# Patient Record
Sex: MALE | Race: WHITE | NOT HISPANIC OR LATINO | Employment: FULL TIME | URBAN - METROPOLITAN AREA
[De-identification: names, ages, dates, MRNs, and addresses within clinical notes are randomized per-mention and may not be internally consistent; named-entity substitution may affect disease eponyms.]

---

## 2017-01-17 ENCOUNTER — OFFICE VISIT (OUTPATIENT)
Dept: FAMILY MEDICINE | Facility: CLINIC | Age: 37
End: 2017-01-17
Payer: COMMERCIAL

## 2017-01-17 VITALS
DIASTOLIC BLOOD PRESSURE: 94 MMHG | HEART RATE: 78 BPM | RESPIRATION RATE: 14 BRPM | OXYGEN SATURATION: 94 % | HEIGHT: 72 IN | TEMPERATURE: 98.7 F | BODY MASS INDEX: 42.66 KG/M2 | SYSTOLIC BLOOD PRESSURE: 160 MMHG | WEIGHT: 315 LBS

## 2017-01-17 DIAGNOSIS — J20.9 ACUTE BRONCHITIS, UNSPECIFIED ORGANISM: Primary | ICD-10-CM

## 2017-01-17 DIAGNOSIS — J30.81 ALLERGIC RHINITIS DUE TO ANIMAL HAIR AND DANDER, UNSPECIFIED RHINITIS SEASONALITY: ICD-10-CM

## 2017-01-17 DIAGNOSIS — I10 ESSENTIAL HYPERTENSION: ICD-10-CM

## 2017-01-17 PROCEDURE — 99214 OFFICE O/P EST MOD 30 MIN: CPT | Performed by: PHYSICIAN ASSISTANT

## 2017-01-17 RX ORDER — FLUTICASONE PROPIONATE 50 MCG
1-2 SPRAY, SUSPENSION (ML) NASAL DAILY
Qty: 1 BOTTLE | Refills: 11 | Status: SHIPPED | OUTPATIENT
Start: 2017-01-17 | End: 2022-01-07

## 2017-01-17 RX ORDER — ALBUTEROL SULFATE 90 UG/1
2 AEROSOL, METERED RESPIRATORY (INHALATION) EVERY 6 HOURS PRN
Qty: 1 INHALER | Refills: 0 | Status: SHIPPED | OUTPATIENT
Start: 2017-01-17 | End: 2022-01-07

## 2017-01-17 RX ORDER — HYDROCHLOROTHIAZIDE 25 MG/1
25 TABLET ORAL DAILY
Qty: 30 TABLET | Refills: 0 | Status: SHIPPED | OUTPATIENT
Start: 2017-01-17 | End: 2017-02-13

## 2017-01-17 RX ORDER — PREDNISONE 20 MG/1
20 TABLET ORAL 2 TIMES DAILY
Qty: 10 TABLET | Refills: 0 | Status: SHIPPED | OUTPATIENT
Start: 2017-01-17 | End: 2017-02-15

## 2017-01-17 NOTE — PATIENT INSTRUCTIONS
Return in 3-4 weeks to recheck your blood pressure  Check your blood pressure at the gym 1-2 times per week; before workouts and write it down and bring it with you next time.

## 2017-01-17 NOTE — MR AVS SNAPSHOT
After Visit Summary   1/17/2017    Al Smith    MRN: [id number]           Patient Information     Date Of Birth          1980        Visit Information        Provider Department      1/17/2017 9:10 AM Siegler, Nicole Joy, PA-C [facility]        Today's Diagnoses     Acute bronchitis, unspecified organism    -  1     Essential hypertension         Allergic rhinitis due to animal hair and dander, unspecified rhinitis seasonality           Care Instructions    Return in 3-4 weeks to recheck your blood pressure  Check your blood pressure at the gym 1-2 times per week; before workouts and write it down and bring it with you next time.         Follow-ups after your visit        Who to contact     If you have questions or need follow up information about today's clinic visit or your schedule please contact [facility] directly at [phone].  Normal or non-critical lab and imaging results will be communicated to you by [email or web address], letter or phone within 4 business days after the clinic has received the results. If you do not hear from us within 7 days, please contact the clinic through [email or web address] or phone. If you have a critical or abnormal lab result, we will notify you by phone as soon as possible.  Submit refill requests through [employer] or call your pharmacy and they will forward the refill request to us. Please allow 3 business days for your refill to be completed.          Additional Information About Your Visit        MyChart Information     [employer] gives you secure access to your electronic health record. If you see a primary care provider, you can also send messages to your care team and make appointments. If you have questions, please call your primary care clinic.  If you do not have a primary care provider, please call [phone] and they will assist you.        Care EveryWhere ID     This is your Care EveryWhere ID. This could be  used by other organizations to access your [place] medical records  [id number]        Your Vitals Were     Pulse Temperature Respirations Height BMI (Body Mass Index) Pulse Oximetry    78 98.7  F (37.1  C) 14 6' (1.829 m) 44.61 kg/m2 94%       Blood Pressure from Last 3 Encounters:   01/17/17 160/94   07/16/16 167/108   04/18/16 130/86    Weight from Last 3 Encounters:   01/17/17 329 lb (149.233 kg)   07/16/16 315 lb (142.883 kg)   04/18/16 324 lb (146.965 kg)              Today, you had the following     No orders found for display         Today's Medication Changes          These changes are accurate as of: 1/17/17  9:29 AM.  If you have any questions, ask your nurse or doctor.               Start taking these medicines.        Dose/Directions    albuterol 108 (90 BASE) MCG/ACT Inhaler   Commonly known as:  PROAIR HFA/PROVENTIL HFA/VENTOLIN HFA   Used for:  Acute bronchitis, unspecified organism   Started by:  Siegler, Nicole Joy, PA-C        Dose:  2 puff   Inhale 2 puffs into the lungs every 6 hours as needed for shortness of breath / dyspnea or wheezing   Quantity:  1 Inhaler   Refills:  0       fluticasone 50 MCG/ACT spray   Commonly known as:  FLONASE   Used for:  Allergic rhinitis due to animal hair and dander, unspecified rhinitis seasonality   Started by:  Siegler, Nicole Joy, PA-C        Dose:  1-2 spray   Spray 1-2 sprays into both nostrils daily   Quantity:  1 Bottle   Refills:  11       hydrochlorothiazide 25 MG tablet   Commonly known as:  HYDRODIURIL   Used for:  Essential hypertension   Started by:  Siegler, Nicole Joy, PA-C        Dose:  25 mg   Take 1 tablet (25 mg) by mouth daily   Quantity:  30 tablet   Refills:  0       predniSONE 20 MG tablet   Commonly known as:  DELTASONE   Used for:  Acute bronchitis, unspecified organism   Started by:  Siegler, Nicole Joy, PA-C        Dose:  20 mg   Take 1 tablet (20 mg) by mouth 2 times daily   Quantity:  10 tablet   Refills:  0            Where to get  your medicines      These medications were sent to [facility] [place] - [address]  [address] [place] [address]     Phone:  [phone]    - albuterol 108 (90 BASE) MCG/ACT Inhaler  - fluticasone 50 MCG/ACT spray  - hydrochlorothiazide 25 MG tablet  - predniSONE 20 MG tablet             Primary Care Provider Office Phone # Fax #    Jonny Manzanares -[phone]       [facility] [address] [facility] [address]        Thank you!     Thank you for choosing [facility]  for your care. Our goal is always to provide you with excellent care. Hearing back from our patients is one way we can continue to improve our services. Please take a few minutes to complete the written survey that you may receive in the mail after your visit with us. Thank you!             Your Updated Medication List - Protect others around you: Learn how to safely use, store and throw away your medicines at www.disposemymeds.org.          This list is accurate as of: 1/17/17  9:29 AM.  Always use your most recent med list.                   Brand Name Dispense Instructions for use    ADVIL PO      Take by mouth as needed for moderate pain       albuterol 108 (90 BASE) MCG/ACT Inhaler    PROAIR HFA/PROVENTIL HFA/VENTOLIN HFA    1 Inhaler    Inhale 2 puffs into the lungs every 6 hours as needed for shortness of breath / dyspnea or wheezing       fluticasone 50 MCG/ACT spray    FLONASE    1 Bottle    Spray 1-2 sprays into both nostrils daily       hydrochlorothiazide 25 MG tablet    HYDRODIURIL    30 tablet    Take 1 tablet (25 mg) by mouth daily       predniSONE 20 MG tablet    DELTASONE    10 tablet    Take 1 tablet (20 mg) by mouth 2 times daily

## 2017-01-17 NOTE — PROGRESS NOTES
SUBJECTIVE:                                                    Al Smith is a 36 year old male who presents to clinic today for the following health issues:    RESPIRATORY SYMPTOMS      Duration: X3 weeks    Description  nasal congestion, facial pain/pressure and cough    Severity: moderate    Accompanying signs and symptoms: None    History (predisposing factors):  none    Precipitating or alleviating factors: None    Therapies tried and outcome:  guaifenesin     As above; about 3 weeks ago he began to have cold symptoms that have since developed into cough and some shortness of breath. Sinus pressure is improved with mucinex and ibuprofen. He has coughing cycles that have been hurting his abdominal muscles. His albuterol inhaler and flonase have been helpful also.     He stopped taking his blood pressure medication in about June/July as he did not feel it was improving his blood pressure when the pharmacy changed (the medicine seemed to be different). He continues to go to the gym 3-4 times per week, and watches his diet though he admits not watching his salt intake.         ROS:  C: Negative for fever, chills, recent change in weight  Skin: Negative for worrisome rashes or lesions  CV: Negative for chest pain or peripheral edema  GI: Negative for nausea, abdominal pain, heartburn, or change in bowel habits  MS: Negative for significant arthralgias or myalgias  P: Negative for changes in mood or affect      PFSH: not a smoker, no hx of asthma.   Was provided albuterol and flonase last year for symptoms similar to this and he has restarted those in the past 2-3 weeks.      Patient Active Problem List   Diagnosis     Morbid obesity (H)     Allergic rhinitis due to animals     Mixed hyperlipidemia     Essential hypertension     Current Outpatient Prescriptions   Medication     hydrochlorothiazide (HYDRODIURIL) 25 MG tablet     predniSONE (DELTASONE) 20 MG tablet     albuterol (PROAIR HFA/PROVENTIL  HFA/VENTOLIN HFA) 108 (90 BASE) MCG/ACT Inhaler     fluticasone (FLONASE) 50 MCG/ACT spray     Ibuprofen (ADVIL PO)     [DISCONTINUED] albuterol (PROAIR HFA, PROVENTIL HFA, VENTOLIN HFA) 108 (90 BASE) MCG/ACT inhaler     No current facility-administered medications for this visit.       OBJECTIVE:                                                    /94 mmHg  Pulse 78  Temp(Src) 98.7  F (37.1  C)  Resp 14  Ht 6' (1.829 m)  Wt 329 lb (149.233 kg)  BMI 44.61 kg/m2  SpO2 94%  Body mass index is 44.61 kg/(m^2).  GENERAL: healthy, alert, in no acute distress  EYES: Grossly normal to inspection, EOMI, PERRL  HENT: Ear canals normal bilateral. TM pearly gray without effusion bilaterally.  Nasal mucosa moderatly edematous with clear rhinorrhea.  Mouth- mucous membranes moist, no lesions or ulcerations. Pharynx erythematous without petechia. and No tonsillary hypertrophy. Uvula midline, clear post-nasal drainage.  Maxillary and frontal sinuses nontender to palpation bilaterally.  NECK: Non-tender, no adenopathy.  RESP: expiratory wheezes mildly throughout, mild rhonchi bibasilar and no rales or decreased breath sounds. No coughing during examination today.   CV: regular rate and rhythm, normal S1 S2.  No peripheral edema.  SKIN: no suspicious lesions, no rashes  PSYCH: Alert and oriented times 3;  Able to articulate logical thoughts. Affect is normal.    Diagnostic test results: none      ASSESSMENT/PLAN:                                                        ICD-10-CM    1. Acute bronchitis, unspecified organism J20.9 predniSONE (DELTASONE) 20 MG tablet     albuterol (PROAIR HFA/PROVENTIL HFA/VENTOLIN HFA) 108 (90 BASE) MCG/ACT Inhaler   2. Essential hypertension I10 hydrochlorothiazide (HYDRODIURIL) 25 MG tablet   3. Allergic rhinitis due to animal hair and dander, unspecified rhinitis seasonality J30.81 fluticasone (FLONASE) 50 MCG/ACT spray     Bronchitis and sinusitis instructions include discussion regarding  continued flonse and mucinex, nasal saline or neti pot to flush sinuses. He will return if not improving with treatment as discussed today or return of fever/chills, body aches or worsening facial pain/pressure for antibiotic. Discussed treatment of bronchitis with prednisone and currently no need for antibiotics therapy.     We also discussed treatment of hypertension. He agreed to restart a medication and I suggested that if the combination medicine was not working well, it may benefit us to use a different combination, starting with hctz alone and then considering addition of a second medication such as lisinopril upon recheck (this would minimize the hctz to 25mg rather than increasing to 50mg, as would be necessary with the dyazide combination, and potential for electrolyte abnormalities). He agrees with this plan.     He will follow up with myself or his pcp for recheck of blood pressure in 3-4 weeks as instructed.  Recheck electrolytes at that time and adjust prescriptions as necessary.     Patient Instructions   Return in 3-4 weeks to recheck your blood pressure  Check your blood pressure at the gym 1-2 times per week; before workouts and write it down and bring it with you next time.       25 total minutes spent with the patient, > 50% face to face discussing the patients concerns and addressing questions in the above assessment and plan.         Nicole Joy Siegler, PA-C  [facility]

## 2017-01-17 NOTE — NURSING NOTE
Chief Complaint   Patient presents with     URI     Cough X3 weeks, sinus congestion     Medication Reconciliation     Off of B/P meds X6 months       Initial /100 mmHg  Pulse 78  Temp(Src) 98.7  F (37.1  C)  Resp 14  Ht 6' (1.829 m)  Wt 329 lb (149.233 kg)  BMI 44.61 kg/m2  SpO2 94% Estimated body mass index is 44.61 kg/(m^2) as calculated from the following:    Height as of this encounter: 6' (1.829 m).    Weight as of this encounter: 329 lb (149.233 kg).  BP completed using cuff size: lacie Ortiz LPN

## 2017-02-13 DIAGNOSIS — I10 ESSENTIAL HYPERTENSION: ICD-10-CM

## 2017-02-14 RX ORDER — HYDROCHLOROTHIAZIDE 25 MG/1
TABLET ORAL
Qty: 30 TABLET | Refills: 0 | Status: SHIPPED | OUTPATIENT
Start: 2017-02-14 | End: 2017-03-15

## 2017-02-14 NOTE — TELEPHONE ENCOUNTER
HYDROCHLOROTHIAZIDE 25 MG TAB      Last Written Prescription Date: 1/17/2017  Last Fill Quantity: 30, # refills: 0  Last Office Visit with [facility], [facility] or [facility] prescribing provider: 1/17/2017  Next 5 appointments (look out 90 days)     Feb 15, 2017  9:10 AM CST   SHORT with Nicole Joy Siegler, PA-C   [facility] ([facility])    [address] [place] [id number] [phone]                   Potassium   Date Value Ref Range Status   03/16/2016 3.8 3.4 - 5.3 mmol/L Final     No results found for: CR  BP Readings from Last 3 Encounters:   01/17/17 (!) 160/94   07/16/16 (!) 167/108   04/18/16 130/86

## 2017-02-14 NOTE — TELEPHONE ENCOUNTER
Medication is being filled for 1 time refill only due to:  Patient needs to be seen because needs follow up medication check and lab work.

## 2017-02-15 ENCOUNTER — OFFICE VISIT (OUTPATIENT)
Dept: FAMILY MEDICINE | Facility: CLINIC | Age: 37
End: 2017-02-15
Payer: COMMERCIAL

## 2017-02-15 VITALS
RESPIRATION RATE: 16 BRPM | DIASTOLIC BLOOD PRESSURE: 84 MMHG | OXYGEN SATURATION: 98 % | BODY MASS INDEX: 42.66 KG/M2 | TEMPERATURE: 98.4 F | WEIGHT: 315 LBS | HEART RATE: 78 BPM | SYSTOLIC BLOOD PRESSURE: 156 MMHG | HEIGHT: 72 IN

## 2017-02-15 DIAGNOSIS — M25.512 CHRONIC LEFT SHOULDER PAIN: ICD-10-CM

## 2017-02-15 DIAGNOSIS — I10 ESSENTIAL HYPERTENSION: Primary | ICD-10-CM

## 2017-02-15 DIAGNOSIS — G89.29 CHRONIC LEFT SHOULDER PAIN: ICD-10-CM

## 2017-02-15 PROCEDURE — 99213 OFFICE O/P EST LOW 20 MIN: CPT | Performed by: PHYSICIAN ASSISTANT

## 2017-02-15 RX ORDER — LISINOPRIL 10 MG/1
10 TABLET ORAL DAILY
Qty: 90 TABLET | Refills: 0 | Status: SHIPPED | OUTPATIENT
Start: 2017-02-15 | End: 2017-03-31

## 2017-02-15 NOTE — PROGRESS NOTES
SUBJECTIVE:                                                    Al Smith is a 36 year old male who presents to clinic today for the following health issues:      Hypertension Follow-up      Outpatient blood pressures are not being checked.    Low Salt Diet: no added salt       Amount of exercise or physical activity: Minimal due to ankle injury    Problems taking medications regularly: No    Medication side effects: none  Diet: low salt    No negative side effects to the hctz he is currently on. It does not seem to be imrpoving his blood pressure as much as he would like. He has not been working on dietary changes.     Left shoulder pain that has been progressively noticeable and intermittent over the past few months. Pressing motions and overhead motions are sometimes the most painful; it goes away on its own with change in activity. He thinks this is the shoulder he had AC seperation on several years ago.     Problem list and histories reviewed & adjusted, as indicated.  Additional history: as documented    Patient Active Problem List   Diagnosis     Morbid obesity (H)     Allergic rhinitis due to animals     Mixed hyperlipidemia     Essential hypertension     Past Surgical History   Procedure Laterality Date     Orthopedic surgery         Social History   Substance Use Topics     Smoking status: Never Smoker     Smokeless tobacco: Never Used     Alcohol use Yes      Comment: socially     Family History   Problem Relation Age of Onset     DIABETES Father      Arthritis Mother          Current Outpatient Prescriptions   Medication Sig Dispense Refill     lisinopril (PRINIVIL/ZESTRIL) 10 MG tablet Take 1 tablet (10 mg) by mouth daily 90 tablet 0     hydrochlorothiazide (HYDRODIURIL) 25 MG tablet TAKE 1 TABLET BY MOUTH DAILY 30 tablet 0     albuterol (PROAIR HFA/PROVENTIL HFA/VENTOLIN HFA) 108 (90 BASE) MCG/ACT Inhaler Inhale 2 puffs into the lungs every 6 hours as needed for shortness of breath / dyspnea  or wheezing 1 Inhaler 0     fluticasone (FLONASE) 50 MCG/ACT spray Spray 1-2 sprays into both nostrils daily 1 Bottle 11     Ibuprofen (ADVIL PO) Take by mouth as needed for moderate pain         ROS:  Constitutional, HEENT, cardiovascular, pulmonary, gi and gu systems are negative, except as otherwise noted.    OBJECTIVE:                                                    /84 (BP Location: Right arm, Patient Position: Chair, Cuff Size: Adult Large)  Pulse 78  Temp 98.4  F (36.9  C) (Tympanic)  Resp 16  Ht 6' (1.829 m)  Wt (!) 330 lb (149.7 kg)  SpO2 98%  BMI 44.76 kg/m2  Body mass index is 44.76 kg/(m^2).  GENERAL: healthy, alert and no distress  Left shoulder; normal skin, no swelling. Tenderness at AC joint only. Full AROM with pain at extreme of fwd flexion and abduction. Fwd flexion and cross body motion cause audible click and crepitus. Empty can test negative, normal strength against resistance.     Diagnostic Test Results:  none      ASSESSMENT/PLAN:                                                        ICD-10-CM    1. Essential hypertension I10 lisinopril (PRINIVIL/ZESTRIL) 10 MG tablet   2. Chronic left shoulder pain M25.512 ORTHOPEDICS ADULT REFERRAL    G89.29        Recheck blood pressure in one month with office visit or blood pressure check with nursing (if he has been controlled with outside checks). Sooner if negative side effects from medication    Referral for his shoulder; no acute concern but we discussed the usefulness of ortho eval and follow up from the start and he will make an appt with them. Likely some distal clavicle/ AC joint issue.     Nicole Joy Siegler, PA-C  [facility]

## 2017-02-15 NOTE — NURSING NOTE
Chief Complaint   Patient presents with     Hypertension     Recheck       Initial /84 (BP Location: Right arm, Patient Position: Chair, Cuff Size: Adult Large)  Pulse 78  Temp 98.4  F (36.9  C) (Tympanic)  Resp 16  Ht 6' (1.829 m)  Wt (!) 330 lb (149.7 kg)  SpO2 98%  BMI 44.76 kg/m2 Estimated body mass index is 44.76 kg/(m^2) as calculated from the following:    Height as of this encounter: 6' (1.829 m).    Weight as of this encounter: 330 lb (149.7 kg).  Medication Reconciliation: complete     Naya Ortiz LPN

## 2017-02-15 NOTE — MR AVS SNAPSHOT
After Visit Summary   2/15/2017    Al Smith    MRN: [id number]           Patient Information     Date Of Birth          1980        Visit Information        Provider Department      2/15/2017 9:10 AM Siegler, Nicole Joy, PA-C [facility]        Today's Diagnoses     Essential hypertension    -  1    Chronic left shoulder pain           Follow-ups after your visit        Additional Services     ORTHOPEDICS ADULT REFERRAL       Your provider has referred you to: [facility] -   [place] [phone]   [email or web address]    Please be aware that coverage of these services is subject to the terms and limitations of your health insurance plan.  Call member services at your health plan with any benefit or coverage questions.      Please bring the following to your appointment:    >>   Any x-rays, CTs or MRIs which have been performed.  Contact the facility where they were done to arrange for  prior to your scheduled appointment.    >>   List of current medications   >>   This referral request   >>   Any documents/labs given to you for this referral                  Who to contact     If you have questions or need follow up information about today's clinic visit or your schedule please contact [facility] directly at [phone].  Normal or non-critical lab and imaging results will be communicated to you by MyChart, letter or phone within 4 business days after the clinic has received the results. If you do not hear from us within 7 days, please contact the clinic through MyChart or phone. If you have a critical or abnormal lab result, we will notify you by phone as soon as possible.  Submit refill requests through [employer] or call your pharmacy and they will forward the refill request to us. Please allow 3 business days for your refill to be completed.          Additional Information About Your Visit         [employer] Information     [employer] gives you secure access to your electronic health record. If you see a primary care provider, you can also send messages to your care team and make appointments. If you have questions, please call your primary care clinic.  If you do not have a primary care provider, please call [phone] and they will assist you.        Care EveryWhere ID     This is your Care EveryWhere ID. This could be used by other organizations to access your [place] medical records  [id number]        Your Vitals Were     Pulse Temperature Respirations Height Pulse Oximetry BMI (Body Mass Index)    78 98.4  F (36.9  C) (Tympanic) 16 6' (1.829 m) 98% 44.76 kg/m2       Blood Pressure from Last 3 Encounters:   02/15/17 156/84   01/17/17 (!) 160/94   07/16/16 (!) 167/108    Weight from Last 3 Encounters:   02/15/17 (!) 330 lb (149.7 kg)   01/17/17 (!) 329 lb (149.2 kg)   07/16/16 (!) 315 lb (142.9 kg)              We Performed the Following     ORTHOPEDICS ADULT REFERRAL          Today's Medication Changes          These changes are accurate as of: 2/15/17  9:22 AM.  If you have any questions, ask your nurse or doctor.               Start taking these medicines.        Dose/Directions    lisinopril 10 MG tablet   Commonly known as:  PRINIVIL/ZESTRIL   Used for:  Essential hypertension   Started by:  Siegler, Nicole Joy, PA-C        Dose:  10 mg   Take 1 tablet (10 mg) by mouth daily   Quantity:  90 tablet   Refills:  0            Where to get your medicines      These medications were sent to [facility] [address] - [place], [place] - [address]  [address] [place] [address]     Phone:  [phone]     lisinopril 10 MG tablet                Primary Care Provider Office Phone # Fax #    Jonny Manzanares -[phone]       [facility] [place] [address] [facility] [address]        Thank you!     Thank you for choosing [facility] [place]  for your  care. Our goal is always to provide you with excellent care. Hearing back from our patients is one way we can continue to improve our services. Please take a few minutes to complete the written survey that you may receive in the mail after your visit with us. Thank you!             Your Updated Medication List - Protect others around you: Learn how to safely use, store and throw away your medicines at www.disposemymeds.org.          This list is accurate as of: 2/15/17  9:22 AM.  Always use your most recent med list.                   Brand Name Dispense Instructions for use    ADVIL PO      Take by mouth as needed for moderate pain       albuterol 108 (90 BASE) MCG/ACT Inhaler    PROAIR HFA/PROVENTIL HFA/VENTOLIN HFA    1 Inhaler    Inhale 2 puffs into the lungs every 6 hours as needed for shortness of breath / dyspnea or wheezing       fluticasone 50 MCG/ACT spray    FLONASE    1 Bottle    Spray 1-2 sprays into both nostrils daily       hydrochlorothiazide 25 MG tablet    HYDRODIURIL    30 tablet    TAKE 1 TABLET BY MOUTH DAILY       lisinopril 10 MG tablet    PRINIVIL/ZESTRIL    90 tablet    Take 1 tablet (10 mg) by mouth daily       predniSONE 20 MG tablet    DELTASONE    10 tablet    Take 1 tablet (20 mg) by mouth 2 times daily

## 2017-02-21 ENCOUNTER — MYC MEDICAL ADVICE (OUTPATIENT)
Dept: FAMILY MEDICINE | Facility: CLINIC | Age: 37
End: 2017-02-21

## 2017-02-21 NOTE — TELEPHONE ENCOUNTER
See mychart message below. Lisinopril was added 2/15/17.  Patient did have bronchitis 1/17/17.  I called the patient and left message to call back.  We need to triage his cough  Macarena Rosas RN- Triage [employer]

## 2017-02-24 NOTE — TELEPHONE ENCOUNTER
Patient called reporting cough, wonders if it is from lisinopril    RESPIRATORY SYMPTOMS      Duration: since 2/15/17    Description  cough    Severity: mild    Accompanying signs and symptoms: phlegm (green in color)    History (predisposing factors):  Bronchitis 1/17/17    Precipitating or alleviating factors: None    Therapies tried and outcome:  rest and fluids        Recent Medication changes: new medication started, patient states that he no longer thinks the cough is due to lisinopril. Will continue to monitor it and come in if the cough worsens. Cough has become productive since his original message.  Home Care information given: Rest and fluids  Advised: Follow up with clinic if: Cough persists.      References used: Telephone Triage Protocols for Nurses 4th edition     Please advise as appropriate with further recommendations as appropriate.    Dian Garcia RN  Phone Triage RN  [facility]

## 2017-03-15 ENCOUNTER — ALLIED HEALTH/NURSE VISIT (OUTPATIENT)
Dept: NURSING | Facility: CLINIC | Age: 37
End: 2017-03-15
Payer: COMMERCIAL

## 2017-03-15 VITALS — SYSTOLIC BLOOD PRESSURE: 140 MMHG | DIASTOLIC BLOOD PRESSURE: 98 MMHG

## 2017-03-15 DIAGNOSIS — I10 ESSENTIAL HYPERTENSION: ICD-10-CM

## 2017-03-15 DIAGNOSIS — E78.2 MIXED HYPERLIPIDEMIA: Primary | ICD-10-CM

## 2017-03-15 DIAGNOSIS — I10 ESSENTIAL HYPERTENSION: Primary | ICD-10-CM

## 2017-03-15 PROCEDURE — 99207 ZZC NO CHARGE NURSE ONLY: CPT

## 2017-03-15 NOTE — NURSING NOTE
Patient came in for a BP check. Initial BP was 152/98. I am having the patient wait about 5-10 minutes to recheck it. After another 5 mins /90 and then after another 5mins /98. Denies any headaches and/or dizziness at this point. Spoke to Keisha LEE( his provider) and she states for patient to start taking Lisinopril 20mg every day and recheck BP here in office in 2 weeks. Patient understood these instructions.  Ally Welsh LPN

## 2017-03-15 NOTE — MR AVS SNAPSHOT
After Visit Summary   3/15/2017    Al Smith    MRN: [id number]           Patient Information     Date Of Birth          1980        Visit Information        Provider Department      3/15/2017 9:00 AM BX NURSE [facility]        Today's Diagnoses     Essential hypertension    -  1       Follow-ups after your visit        Your next 10 appointments already scheduled     Mar 31, 2017  9:00 AM CDT   Nurse Only with BX NURSE   [facility]    [address] [place] [id number] [phone]              Who to contact     If you have questions or need follow up information about today's clinic visit or your schedule please contact [facility] directly at [phone].  Normal or non-critical lab and imaging results will be communicated to you by [employer]ception [employer]hart, letter or phone within 4 business days after the clinic has received the results. If you do not hear from us within 7 days, please contact the clinic through [employer] or phone. If you have a critical or abnormal lab result, we will notify you by phone as soon as possible.  Submit refill requests through WALTOP or call your pharmacy and they will forward the refill request to us. Please allow 3 business days for your refill to be completed.          Additional Information About Your Visit        MyChart Information     W[employer]OP gives you secure access to your electronic health record. If you see a primary care provider, you can also send messages to your care team and make appointments. If you have questions, please call your primary care clinic.  If you do not have a primary care provider, please call [phone] and they will assist you.        Care EveryWhere ID     This is your Care EveryWhere ID. This could be used by other organizations to access your [facility]  records  [id number]         Blood Pressure from Last 3 Encounters:   03/15/17 (!) 140/98   02/15/17 156/84   01/17/17 (!) 160/94    Weight from Last 3 Encounters:   02/15/17 (!) 330 lb (149.7 kg)   01/17/17 (!) 329 lb (149.2 kg)   07/16/16 (!) 315 lb (142.9 kg)              Today, you had the following     No orders found for display         Today's Medication Changes          These changes are accurate as of: 3/15/17  9:33 AM.  If you have any questions, ask your nurse or doctor.               These medicines have changed or have updated prescriptions.        Dose/Directions    lisinopril 10 MG tablet   Commonly known as:  PRINIVIL/ZESTRIL   This may have changed:    - how much to take  - additional instructions   Used for:  Essential hypertension        Dose:  10 mg   Take 1 tablet (10 mg) by mouth daily   Quantity:  90 tablet   Refills:  0                Primary Care Provider Office Phone # Fax #    Jonny Manzanares [phone]       [facility] [address] [facility] [address]        Thank you!     Thank you for choosing [facility]  for your care. Our goal is always to provide you with excellent care. Hearing back from our patients is one way we can continue to improve our services. Please take a few minutes to complete the written survey that you may receive in the mail after your visit with us. Thank you!             Your Updated Medication List - Protect others around you: Learn how to safely use, store and throw away your medicines at www.disposemymeds.org.          This list is accurate as of: 3/15/17  9:33 AM.  Always use your most recent med list.                   Brand Name Dispense Instructions for use    ADVIL PO      Take by mouth as needed for moderate pain       albuterol 108 (90 BASE) MCG/ACT Inhaler    PROAIR HFA/PROVENTIL HFA/VENTOLIN HFA    1 Inhaler    Inhale 2 puffs into the lungs every 6 hours as needed for shortness  of breath / dyspnea or wheezing       fluticasone 50 MCG/ACT spray    FLONASE    1 Bottle    Spray 1-2 sprays into both nostrils daily       hydrochlorothiazide 25 MG tablet    HYDRODIURIL    30 tablet    TAKE 1 TABLET BY MOUTH DAILY       lisinopril 10 MG tablet    PRINIVIL/ZESTRIL    90 tablet    Take 1 tablet (10 mg) by mouth daily

## 2017-03-15 NOTE — NURSING NOTE
After about 9 minutes it was still high at 144/90. I am having him wait again to recheck to see if we can get it to come down a little bit more.

## 2017-03-16 RX ORDER — HYDROCHLOROTHIAZIDE 25 MG/1
TABLET ORAL
Qty: 30 TABLET | Refills: 0 | Status: SHIPPED | OUTPATIENT
Start: 2017-03-16 | End: 2017-04-12

## 2017-03-16 NOTE — TELEPHONE ENCOUNTER
Medication is being filled for 1 time refill only due to:  Patient needs labs for more refills..   Left message asking pt to call for lab only appt.

## 2017-03-16 NOTE — TELEPHONE ENCOUNTER
Hydrochlorothiazide 25 mg      Last Written Prescription Date: 2/14/17  Last Fill Quantity: 30, # refills: 0  Last Office Visit with [facility], [facility] or [facility] prescribing provider: 2/15/17  Next 5 appointments (look out 90 days)     Mar 31, 2017  9:00 AM CDT   Nurse Only with BX NURSE   [facility]    [address] [place] [id number]   [phone]                   Potassium   Date Value Ref Range Status   03/16/2016 3.8 3.4 - 5.3 mmol/L Final     No results found for: CR  BP Readings from Last 3 Encounters:   03/15/17 (!) 140/98   02/15/17 156/84   01/17/17 (!) 160/94

## 2017-03-16 NOTE — TELEPHONE ENCOUNTER
Patient due for labs now. States he was seen yesterday for BP check- nurse only appt.   Would like providers advise if labs necessary. Informed pt medication is a diuretic that requires labs.   CMP pended & lipid panel order.Please advice on labs and refill.

## 2017-03-21 DIAGNOSIS — R73.01 ELEVATED FASTING GLUCOSE: Primary | ICD-10-CM

## 2017-03-21 DIAGNOSIS — I10 ESSENTIAL HYPERTENSION: ICD-10-CM

## 2017-03-21 DIAGNOSIS — E78.2 MIXED HYPERLIPIDEMIA: ICD-10-CM

## 2017-03-21 LAB
ALBUMIN SERPL-MCNC: 3.8 G/DL (ref 3.4–5)
ALP SERPL-CCNC: 57 U/L (ref 40–150)
ALT SERPL W P-5'-P-CCNC: 50 U/L (ref 0–70)
ANION GAP SERPL CALCULATED.3IONS-SCNC: 6 MMOL/L (ref 3–14)
AST SERPL W P-5'-P-CCNC: 21 U/L (ref 0–45)
BILIRUB SERPL-MCNC: 0.4 MG/DL (ref 0.2–1.3)
BUN SERPL-MCNC: 14 MG/DL (ref 7–30)
CALCIUM SERPL-MCNC: 8.9 MG/DL (ref 8.5–10.1)
CHLORIDE SERPL-SCNC: 102 MMOL/L (ref 94–109)
CHOLEST SERPL-MCNC: 183 MG/DL
CO2 SERPL-SCNC: 30 MMOL/L (ref 20–32)
CREAT SERPL-MCNC: 0.68 MG/DL (ref 0.66–1.25)
GFR SERPL CREATININE-BSD FRML MDRD: ABNORMAL ML/MIN/1.7M2
GLUCOSE SERPL-MCNC: 163 MG/DL (ref 70–99)
HDLC SERPL-MCNC: 32 MG/DL
LDLC SERPL CALC-MCNC: 100 MG/DL
NONHDLC SERPL-MCNC: 151 MG/DL
POTASSIUM SERPL-SCNC: 4.2 MMOL/L (ref 3.4–5.3)
PROT SERPL-MCNC: 7.5 G/DL (ref 6.8–8.8)
SODIUM SERPL-SCNC: 138 MMOL/L (ref 133–144)
TRIGL SERPL-MCNC: 255 MG/DL

## 2017-03-21 PROCEDURE — 36415 COLL VENOUS BLD VENIPUNCTURE: CPT | Performed by: PHYSICIAN ASSISTANT

## 2017-03-21 PROCEDURE — 80061 LIPID PANEL: CPT | Performed by: PHYSICIAN ASSISTANT

## 2017-03-21 PROCEDURE — 80053 COMPREHEN METABOLIC PANEL: CPT | Performed by: PHYSICIAN ASSISTANT

## 2017-03-27 ENCOUNTER — TELEPHONE (OUTPATIENT)
Dept: FAMILY MEDICINE | Facility: CLINIC | Age: 37
End: 2017-03-27

## 2017-03-27 DIAGNOSIS — I10 ESSENTIAL HYPERTENSION: ICD-10-CM

## 2017-03-27 NOTE — LETTER
[facility]  [address]  [facility] [address]  [phone]                                                                                                           Al Smith  [address]  [facility] [address]    March 27, 2017          Dear Al Smith,      We care about your well-being!  In order to ensure we are providing the best quality care, we have reviewed your chart and see that you are due for:     _____ Physical   _____ Pap Smear   _____ Mammogram   _____ Diabetes Followup   __X___ Hypertension Followup   _____ Cholesterol Followup (Provider Appointment)   _____ Cholesterol Test (Lab-Only Appointment)   _____ Other:       We can assist you in scheduling these appointments at [phone].    If you have had (or plan to have) any of these tests done at a facility other than [facility] or a [facility], please have the results from these tests sent to your primary physician at [facility].           Sincerely,    Jonny Manzanares MD

## 2017-03-27 NOTE — TELEPHONE ENCOUNTER
Panel Management Review      Patient has the following on his problem list:     Hypertension   Last three blood pressure readings:  BP Readings from Last 3 Encounters:   03/15/17 (!) 140/98   02/15/17 156/84   01/17/17 (!) 160/94     Blood pressure: Failed    HTN Guidelines:  Age 18-59 BP range:  Less than 140/90  Age 60-85 with Diabetes:  Less than 140/90  Age 60-85 without Diabetes:  less than 150/90      Composite cancer screening  Chart review shows that this patient is due/due soon for the following None  Summary:    Patient is due/failing the following:   BP CHECK    Action needed:   Patient needs office visit for bp check.    Type of outreach:    Sent letter.    Questions for provider review:    None                                                                                                                                    Amy Buckley CMA

## 2017-03-31 ENCOUNTER — ALLIED HEALTH/NURSE VISIT (OUTPATIENT)
Dept: NURSING | Facility: CLINIC | Age: 37
End: 2017-03-31
Payer: COMMERCIAL

## 2017-03-31 VITALS — DIASTOLIC BLOOD PRESSURE: 90 MMHG | SYSTOLIC BLOOD PRESSURE: 138 MMHG

## 2017-03-31 DIAGNOSIS — Z01.30 BP CHECK: Primary | ICD-10-CM

## 2017-03-31 PROCEDURE — 99207 ZZC NO CHARGE NURSE ONLY: CPT

## 2017-03-31 RX ORDER — LISINOPRIL 10 MG/1
30 TABLET ORAL DAILY
Qty: 90 TABLET | Refills: 0 | COMMUNITY
Start: 2017-03-31 | End: 2017-04-12

## 2017-03-31 NOTE — TELEPHONE ENCOUNTER
Pt was seen for blood pressure check today and was still high. His blood pressure normalized after waiting about 10 mins. He has been taking hctz 25mg and lisinopril 20mg. I recommended he increase to 30mg lisinopril and return for office visit with myself or Dr Manzanares in 2-3 weeks (before he runs out of [facility]). He agreed.Nicole Joy Siegler, PA-C

## 2017-03-31 NOTE — NURSING NOTE
Patient is here today to check BP per Nicole Siegler. When I initially checked it, it was high at 152/96. I had the patient wait about 10-15 minutes to see if it has come down at all.  The second time I checked the BP it was at 138/90.

## 2017-03-31 NOTE — MR AVS SNAPSHOT
After Visit Summary   3/31/2017    Al Smith    MRN: [id number]           Patient Information     Date Of Birth          1980        Visit Information        Provider Department      3/31/2017 9:00 AM BX NURSE [facility]        Today's Diagnoses     BP check    -  1       Follow-ups after your visit        Who to contact     If you have questions or need follow up information about today's clinic visit or your schedule please contact [facility] directly at [phone].  Normal or non-critical lab and imaging results will be communicated to you by Funin[employer], letter or phone within 4 business days after the clinic has received the results. If you do not hear from us within 7 days, please contact the clinic through [employer] or phone. If you have a critical or abnormal lab result, we will notify you by phone as soon as possible.  Submit refill requests through [employer] or call your pharmacy and they will forward the refill request to us. Please allow 3 business days for your refill to be completed.          Additional Information About Your Visit        MyChart Information     [employer] gives you secure access to your electronic health record. If you see a primary care provider, you can also send messages to your care team and make appointments. If you have questions, please call your primary care clinic.  If you do not have a primary care provider, please call [phone] and they will assist you.        Care EveryWhere ID     This is your Care EveryWhere ID. This could be used by other organizations to access your [facility] medical records  [id number]         Blood Pressure from Last 3 Encounters:   03/31/17 138/90   03/15/17 (!) 140/98   02/15/17 156/84    Weight from Last 3 Encounters:   02/15/17 (!) 330 lb (149.7 kg)   01/17/17 (!) 329 lb (149.2 kg)   07/16/16 (!) 315 lb (142.9 kg)              Today, you had the following     No  orders found for display         Today's Medication Changes          These changes are accurate as of: 3/31/17  9:14 AM.  If you have any questions, ask your nurse or doctor.               These medicines have changed or have updated prescriptions.        Dose/Directions    lisinopril 10 MG tablet   Commonly known as:  PRINIVIL/ZESTRIL   This may have changed:    - how much to take  - additional instructions   Used for:  Essential hypertension        Dose:  10 mg   Take 1 tablet (10 mg) by mouth daily   Quantity:  90 tablet   Refills:  0                Primary Care Provider Office Phone # Fax #    Jonny Manzanares [phone]       [facility] [address] [facility] [address]        Thank you!     Thank you for choosing [facility]  for your care. Our goal is always to provide you with excellent care. Hearing back from our patients is one way we can continue to improve our services. Please take a few minutes to complete the written survey that you may receive in the mail after your visit with us. Thank you!             Your Updated Medication List - Protect others around you: Learn how to safely use, store and throw away your medicines at www.disposemymeds.org.          This list is accurate as of: 3/31/17  9:14 AM.  Always use your most recent med list.                   Brand Name Dispense Instructions for use    ADVIL PO      Take by mouth as needed for moderate pain       albuterol 108 (90 BASE) MCG/ACT Inhaler    PROAIR HFA/PROVENTIL HFA/VENTOLIN HFA    1 Inhaler    Inhale 2 puffs into the lungs every 6 hours as needed for shortness of breath / dyspnea or wheezing       fluticasone 50 MCG/ACT spray    FLONASE    1 Bottle    Spray 1-2 sprays into both nostrils daily       hydrochlorothiazide 25 MG tablet    HYDRODIURIL    30 tablet    TAKE 1 TABLET BY MOUTH DAILY       lisinopril 10 MG tablet    PRINIVIL/ZESTRIL    90 tablet    Take 1 tablet  (10 mg) by mouth daily

## 2017-04-12 ENCOUNTER — TELEPHONE (OUTPATIENT)
Dept: FAMILY MEDICINE | Facility: CLINIC | Age: 37
End: 2017-04-12

## 2017-04-12 ENCOUNTER — OFFICE VISIT (OUTPATIENT)
Dept: FAMILY MEDICINE | Facility: CLINIC | Age: 37
End: 2017-04-12
Payer: COMMERCIAL

## 2017-04-12 VITALS
RESPIRATION RATE: 16 BRPM | BODY MASS INDEX: 44.21 KG/M2 | WEIGHT: 315 LBS | DIASTOLIC BLOOD PRESSURE: 86 MMHG | TEMPERATURE: 97.2 F | HEART RATE: 74 BPM | SYSTOLIC BLOOD PRESSURE: 132 MMHG

## 2017-04-12 DIAGNOSIS — E66.01 MORBID OBESITY DUE TO EXCESS CALORIES (H): ICD-10-CM

## 2017-04-12 DIAGNOSIS — R73.01 ELEVATED FASTING GLUCOSE: Primary | ICD-10-CM

## 2017-04-12 DIAGNOSIS — E78.2 MIXED HYPERLIPIDEMIA: ICD-10-CM

## 2017-04-12 DIAGNOSIS — I10 ESSENTIAL HYPERTENSION: ICD-10-CM

## 2017-04-12 DIAGNOSIS — J30.2 SEASONAL ALLERGIC RHINITIS, UNSPECIFIED ALLERGIC RHINITIS TRIGGER: ICD-10-CM

## 2017-04-12 DIAGNOSIS — N52.8 OTHER MALE ERECTILE DYSFUNCTION: ICD-10-CM

## 2017-04-12 LAB — HBA1C MFR BLD: 7.4 % (ref 4.3–6)

## 2017-04-12 PROCEDURE — 99214 OFFICE O/P EST MOD 30 MIN: CPT | Performed by: FAMILY MEDICINE

## 2017-04-12 PROCEDURE — 83036 HEMOGLOBIN GLYCOSYLATED A1C: CPT | Performed by: PHYSICIAN ASSISTANT

## 2017-04-12 PROCEDURE — 36415 COLL VENOUS BLD VENIPUNCTURE: CPT | Performed by: PHYSICIAN ASSISTANT

## 2017-04-12 RX ORDER — HYDROCHLOROTHIAZIDE 25 MG/1
25 TABLET ORAL DAILY
Qty: 90 TABLET | Refills: 1 | Status: SHIPPED | OUTPATIENT
Start: 2017-04-12 | End: 2018-01-17

## 2017-04-12 RX ORDER — LISINOPRIL 10 MG/1
30 TABLET ORAL DAILY
Qty: 90 TABLET | Refills: 1 | Status: SHIPPED | OUTPATIENT
Start: 2017-04-12 | End: 2017-06-12

## 2017-04-12 NOTE — NURSING NOTE
Chief Complaint   Patient presents with     Hypertension       Initial /88  Pulse 74  Temp 97.2  F (36.2  C) (Tympanic)  Resp 16  Wt (!) 326 lb (147.9 kg)  BMI 44.21 kg/m2 Estimated body mass index is 44.21 kg/(m^2) as calculated from the following:    Height as of 2/15/17: 6' (1.829 m).    Weight as of this encounter: 326 lb (147.9 kg).  Medication Reconciliation: complete     Amy Buckley, JEANNINE

## 2017-04-12 NOTE — TELEPHONE ENCOUNTER
Blood pressure seems to have been good. His hemoglobin A1C is 7.4 which establishes a diagnosis of diabetes. He should return to clinic for discussion of diet, exercise and starting medication (metformin) for this condition. He agrees to return to discuss the results further. Nicole Joy Siegler, PA-C

## 2017-04-12 NOTE — PATIENT INSTRUCTIONS
The patient fell off the wagon the last 6 months of 2016 with his exercise and diet regimen.  He is now back in that mode.  He is tolerating his blood pressure medicines well.  While his blood pressure is not graded is acceptable at this point.  We had a lengthy discussion about diabetes, weight and exercise, and blood pressure.  His father is a diabetic so he gets the picture in part.  We will check his hemoglobin A1c today.  We had a discussion about what that shows.  I did suggest that if that is elevated and shows that he is a diabetic that we would get both he and his wife into the diabetic education classes.  I did not place him on any medications at present for his diabetes.    We also discussed erectile dysfunction.  Tian has times when he can't get an erection, other times when he has premature ejaculation and other times when everything is absolutely normal.  I suggested that we find out about his diabetes before we start tackling that is an issue.  He was in agreement.

## 2017-04-12 NOTE — PROGRESS NOTES
SUBJECTIVE:                                                    Al Smith is a 36 year old male who presents to clinic today for the following health issues:      Hypertension Follow-up      Outpatient blood pressures are being checked at store.  Results are 122/78.    Low Salt Diet: no added salt       Amount of exercise or physical activity: 2-3 days/week for an average of 30-45 minutes    Problems taking medications regularly: No    Medication side effects: none    Diet: regular (no restrictions)      hyperglycemia      Duration: one month    Description (location/character/radiation): N/A    Intensity:  mild    Accompanying signs and symptoms: none    History (similar episodes/previous evaluation): None    Precipitating or alleviating factors: possibly weight    Therapies tried and outcome: None       Problem list and histories reviewed & adjusted, as indicated.  Additional history: Dad is a diabetic    Patient Active Problem List   Diagnosis     Morbid obesity (H)     Seasonal allergic rhinitis     Mixed hyperlipidemia     Essential hypertension     Elevated fasting glucose     Other male erectile dysfunction     Past Surgical History:   Procedure Laterality Date     ORTHOPEDIC SURGERY         Social History   Substance Use Topics     Smoking status: Never Smoker     Smokeless tobacco: Never Used     Alcohol use Yes      Comment: socially     Family History   Problem Relation Age of Onset     DIABETES Father      Arthritis Mother            Reviewed and updated as needed this visit by clinical staff  Tobacco  Allergies  Meds       Reviewed and updated as needed this visit by Provider  Tobacco         ROS:  Constitutional, neuro, ENT, endocrine, pulmonary, cardiac, gastrointestinal, genitourinary, musculoskeletal, integument and psychiatric systems are negative, except as otherwise noted.    OBJECTIVE:                                                    /86  Pulse 74  Temp 97.2  F (36.2  C)  (Tympanic)  Resp 16  Wt (!) 326 lb (147.9 kg)  BMI 44.21 kg/m2  Body mass index is 44.21 kg/(m^2).  GENERAL APPEARANCE: healthy, alert, no distress and Nourishment obese          ASSESSMENT/PLAN:                                                        ICD-10-CM    1. Elevated fasting glucose R73.01 Hemoglobin A1c   2. Mixed hyperlipidemia E78.2    3. Essential hypertension I10 lisinopril (PRINIVIL/ZESTRIL) 10 MG tablet     hydrochlorothiazide (HYDRODIURIL) 25 MG tablet   4. Morbid obesity due to excess calories (H) E66.01    5. Seasonal allergic rhinitis, unspecified allergic rhinitis trigger J30.2    6. Other male erectile dysfunction N52.8        Patient Instructions   The patient fell off the wagon the last 6 months of 2016 with his exercise and diet regimen.  He is now back in that mode.  He is tolerating his blood pressure medicines well.  While his blood pressure is not graded is acceptable at this point.  We had a lengthy discussion about diabetes, weight and exercise, and blood pressure.  His father is a diabetic so he gets the picture in part.  We will check his hemoglobin A1c today.  We had a discussion about what that shows.  I did suggest that if that is elevated and shows that he is a diabetic that we would get both he and his wife into the diabetic education classes.  I did not place him on any medications at present for his diabetes.    We also discussed erectile dysfunction.  Tian has times when he can't get an erection, other times when he has premature ejaculation and other times when everything is absolutely normal.  I suggested that we find out about his diabetes before we start tackling that is an issue.  He was in agreement.      Jonny Manz[facility]res [facility]

## 2017-04-12 NOTE — MR AVS SNAPSHOT
After Visit Summary   4/12/2017    Al Smith    MRN: [id number]           Patient Information     Date Of Birth          1980        Visit Information        Provider Department      4/12/2017 11:30 AM Jonny Manzanares MD [facility]        Today's Diagnoses     Elevated fasting glucose    -  1    Mixed hyperlipidemia        Essential hypertension        Morbid obesity due to excess calories (H)        Seasonal allergic rhinitis, unspecified allergic rhinitis trigger        Other male erectile dysfunction          Care Instructions    The patient fell off the wagon the last 6 months of 2016 with his exercise and diet regimen.  He is now back in that mode.  He is tolerating his blood pressure medicines well.  While his blood pressure is not graded is acceptable at this point.  We had a lengthy discussion about diabetes, weight and exercise, and blood pressure.  His father is a diabetic so he gets the picture in part.  We will check his hemoglobin A1c today.  We had a discussion about what that shows.  I did suggest that if that is elevated and shows that he is a diabetic that we would get both he and his wife into the diabetic education classes.  I did not place him on any medications at present for his diabetes.    We also discussed erectile dysfunction.  Tian has times when he can't get an erection, other times when he has premature ejaculation and other times when everything is absolutely normal.  I suggested that we find out about his diabetes before we start tackling that is an issue.  He was in agreement.        Follow-ups after your visit        Who to contact     If you have questions or need follow up information about today's clinic visit or your schedule please contact [facility] directly at [phone].  Normal or non-critical lab and imaging results will be communicated to you by MyChart, letter or phone within 4  business days after the clinic has received the results. If you do not hear from us within 7 days, please contact the clinic through OnTheList or phone. If you have a critical or abnormal lab result, we will notify you by phone as soon as possible.  Submit refill requests through On[employer] or call your pharmacy and they will forward the refill request to us. Please allow 3 business days for your refill to be completed.          Additional Information About Your Visit        OnTheList Information     On[employer]List gives you secure access to your electronic health record. If you see a primary care provider, you can also send messages to your care team and make appointments. If you have questions, please call your primary care clinic.  If you do not have a primary care provider, please call [phone] and they will assist you.        Care EveryWhere ID     This is your Care EveryWhere ID. This could be used by other organizations to access your [facility] medical records  [id number]        Your Vitals Were     Pulse Temperature Respirations BMI (Body Mass Index)          74 97.2  F (36.2  C) (Tympanic) 16 44.21 kg/m2         Blood Pressure from Last 3 Encounters:   04/12/17 132/86   03/31/17 138/90   03/15/17 (!) 140/98    Weight from Last 3 Encounters:   04/12/17 (!) 326 lb (147.9 kg)   02/15/17 (!) 330 lb (149.7 kg)   01/17/17 (!) 329 lb (149.2 kg)              We Performed the Following     Hemoglobin A1c          Today's Medication Changes          These changes are accurate as of: 4/12/17  1:24 PM.  If you have any questions, ask your nurse or doctor.               These medicines have changed or have updated prescriptions.        Dose/Directions    hydrochlorothiazide 25 MG tablet   Commonly known as:  HYDRODIURIL   This may have changed:  See the new instructions.   Used for:  Essential hypertension   Changed by:  Jonny Manzanares MD        Dose:  25 mg   Take 1 tablet (25 mg) by mouth daily   Quantity:  90 tablet    Refills:  1            Where to get your medicines      These medications were sent to [facility] [address] [facility] [place] - [address]  [address] [place] [place] [address]     Phone:  [phone]     hydrochlorothiazide 25 MG tablet    lisinopril 10 MG tablet                Primary Care Provider Office Phone # Fax #    Jonny Manzanares [phone]       [facility] [address] [facility] [address]        Thank you!     Thank you for choosing [facility]  for your care. Our goal is always to provide you with excellent care. Hearing back from our patients is one way we can continue to improve our services. Please take a few minutes to complete the written survey that you may receive in the mail after your visit with us. Thank you!             Your Updated Medication List - Protect others around you: Learn how to safely use, store and throw away your medicines at www.disposemymeds.org.          This list is accurate as of: 4/12/17  1:24 PM.  Always use your most recent med list.                   Brand Name Dispense Instructions for use    ADVIL PO      Take by mouth as needed for moderate pain       albuterol 108 (90 BASE) MCG/ACT Inhaler    PROAIR HFA/PROVENTIL HFA/VENTOLIN HFA    1 Inhaler    Inhale 2 puffs into the lungs every 6 hours as needed for shortness of breath / dyspnea or wheezing       fluticasone 50 MCG/ACT spray    FLONASE    1 Bottle    Spray 1-2 sprays into both nostrils daily       hydrochlorothiazide 25 MG tablet    HYDRODIURIL    90 tablet    Take 1 tablet (25 mg) by mouth daily       lisinopril 10 MG tablet    PRINIVIL/ZESTRIL    90 tablet    Take 3 tablets (30 mg) by mouth daily

## 2017-04-14 ENCOUNTER — OFFICE VISIT (OUTPATIENT)
Dept: FAMILY MEDICINE | Facility: CLINIC | Age: 37
End: 2017-04-14
Payer: COMMERCIAL

## 2017-04-14 VITALS
HEIGHT: 72 IN | HEART RATE: 85 BPM | BODY MASS INDEX: 42.66 KG/M2 | RESPIRATION RATE: 18 BRPM | SYSTOLIC BLOOD PRESSURE: 136 MMHG | TEMPERATURE: 97.5 F | OXYGEN SATURATION: 97 % | WEIGHT: 315 LBS | DIASTOLIC BLOOD PRESSURE: 82 MMHG

## 2017-04-14 DIAGNOSIS — E66.01 MORBID OBESITY DUE TO EXCESS CALORIES (H): ICD-10-CM

## 2017-04-14 DIAGNOSIS — E11.9 TYPE 2 DIABETES MELLITUS WITHOUT COMPLICATION, WITHOUT LONG-TERM CURRENT USE OF INSULIN (H): Primary | ICD-10-CM

## 2017-04-14 DIAGNOSIS — I10 ESSENTIAL HYPERTENSION: ICD-10-CM

## 2017-04-14 PROCEDURE — 99207 C FOOT EXAM  NO CHARGE: CPT | Performed by: PHYSICIAN ASSISTANT

## 2017-04-14 PROCEDURE — 99213 OFFICE O/P EST LOW 20 MIN: CPT | Performed by: PHYSICIAN ASSISTANT

## 2017-04-14 NOTE — PATIENT INSTRUCTIONS
Healthy Meals for Diabetes  Ask your healthcare team to help you make a meal plan that fits your needs. Your meal plan tells you when to eat your meals and snacks, what kinds of foods to eat, and how much of each food to eat. You don t have to give up all the foods you like. But you do need to follow some guidelines.  Choose healthy carbohydrates    Starches, sugars, and fiber are all types of carbohydrates. Fiber can help lower your cholesterol and triglycerides. Fiber is also healthy for your heart. You should have 20 to 35 grams of total fiber each day. Fiber-rich foods include:    Whole-grain breads and cereals    Bulgur wheat    Brown rice       Whole-wheat pasta    Fruits and vegetables    Dry beans, and peas   It s important to keep track of the amount of carbohydrates you eat. This can help you keep the right balance of physical activity and medicine. The amount of carbohydrates needed will vary for each person. It depends on many things such as your health, the medicines you take, and how active you are. Your healthcare team will help you figure out the right amount of carbohydrates for you. You may start with around 45 to 60 grams of carbohydrates per meal, depending on your situation.   Here are some examples of foods containing about 15 grams of carbohydrates (1 serving of carbohydrates):    1/2 cup of canned or frozen fruit    A small piece of fresh fruit (4 ounces)    1 slice of bread    1/2 cup of oatmeal    1/3 cup of rice    4 to 6 crackers    1/2 English muffin    1/2 cup of black beans    1/4 of a large baked potato (3 ounces)    2/3 cup of plain fat-free yogurt    1 cup of soup    1/2 cup of casserole    6 chicken nuggets    2-inch square brownie or cake without frosting    2 small cookies    1/2 cup of ice cream or sherbet  Choose healthy protein foods  Eating protein that is low in fat can help you control your weight. It also helps keep your heart healthy. Low-fat protein foods include:     Fish    Plant proteins, such as dry beans and peas, nuts, and soy products like tofu and soymilk    Lean meat with all visible fat removed    Poultry with the skin removed    Low-fat or nonfat milk, cheese, and yogurt  Limit unhealthy fats and sugar  Saturated and trans fats are unhealthy for your heart. They raise LDL (bad) cholesterol. Fat is also high in calories, so it can make you gain weight. To cut down on unhealthy fats and sugar, limit these foods:    Butter or margarine    Palm and palm kernel oils and coconut oil    Cream    Cheese    Fuentes    Lunch meats       Ice cream    Sweet bakery goods such as pies, muffins, and donuts    Jams and jellies    Candy bars    Regular sodas   How much to eat  The amount of food you eat affects your blood sugar. It also affects your weight. Your health care team will tell you how much of each type of food you should eat.    Use measuring cups and spoons and a food scale to measure serving sizes.    Learn what a correct serving size looks like on your plate. This will help when you are away from home and can t measure your servings.    Eat only the number of servings given on your meal plan for each food. Don t take seconds.    Learn to read food labels. Be sure to look at serving size, total carbohydrates, fiber, calories, sugar, and saturated and trans fats.  When to eat  Your meal plan will likely include breakfast, lunch, dinner, and some snacks.    Try to eat your meals and snacks at about the same times each day.    Eat all your meals and snacks. Skipping a meal or snack can make your blood sugar drop too low. It can also cause you to eat too much at the next meal or snack. Then your blood sugar could get too high.    8752-0358 [employer]. [address] [place] [address], [place] [address]. All rights reserved. This information is not intended as a substitute for professional medical care. Always follow your healthcare professional's instructions.

## 2017-04-14 NOTE — PROGRESS NOTES
SUBJECTIVE:                                                    Al Smith is a 36 year old male who presents to clinic today for the following health issues:    Hypertension Follow-up      Outpatient blood pressures are not being checked.    Low Salt Diet: not monitoring salt       Amount of exercise or physical activity: 2-3 days/week for an average of 15-30 minutes    Problems taking medications regularly: No    Medication side effects: none    Diet: regular (no restrictions)    Pt returns for discussion of diabetic labs and new diagnosis of diabetes.     Problem list and histories reviewed & adjusted, as indicated.  Additional history: as documented    Patient Active Problem List   Diagnosis     Morbid obesity (H)     Seasonal allergic rhinitis     Mixed hyperlipidemia     Essential hypertension     Other male erectile dysfunction     Type 2 diabetes mellitus without complication, without long-term current use of insulin (H)     Past Surgical History:   Procedure Laterality Date     ORTHOPEDIC SURGERY         Social History   Substance Use Topics     Smoking status: Never Smoker     Smokeless tobacco: Never Used     Alcohol use Yes      Comment: socially     Family History   Problem Relation Age of Onset     DIABETES Father      Arthritis Mother          Current Outpatient Prescriptions   Medication Sig Dispense Refill     metFORMIN (GLUCOPHAGE) 500 MG tablet Take 1 tablet (500 mg) by mouth 2 times daily (with meals) Take 1 tab once daily for the first week, then increase to twice daily 60 tablet 2     lisinopril (PRINIVIL/ZESTRIL) 10 MG tablet Take 3 tablets (30 mg) by mouth daily 90 tablet 1     hydrochlorothiazide (HYDRODIURIL) 25 MG tablet Take 1 tablet (25 mg) by mouth daily 90 tablet 1     albuterol (PROAIR HFA/PROVENTIL HFA/VENTOLIN HFA) 108 (90 BASE) MCG/ACT Inhaler Inhale 2 puffs into the lungs every 6 hours as needed for shortness of breath / dyspnea or wheezing 1 Inhaler 0     fluticasone  (FLONASE) 50 MCG/ACT spray Spray 1-2 sprays into both nostrils daily 1 Bottle 11     Ibuprofen (ADVIL PO) Take by mouth as needed for moderate pain         ROS:  Constitutional, HEENT, cardiovascular, pulmonary, gi and gu systems are negative, except as otherwise noted.    OBJECTIVE:                                                    /82 (BP Location: Left arm, Patient Position: Left side, Cuff Size: Adult Large)  Pulse 85  Temp 97.5  F (36.4  C) (Tympanic)  Resp 18  Ht 6' (1.829 m)  Wt (!) 322 lb (146.1 kg)  SpO2 97%  BMI 43.67 kg/m2  Body mass index is 43.67 kg/(m^2).  GENERAL: healthy, alert and no distress  RESP: non labored breathing  SKIN: no pallor or jaundice. Mild diaphoresis.   Diabetic foot exam: normal DP and PT pulses, no trophic changes or ulcerative lesions, normal sensory exam and normal monofilament exam    Diagnostic Test Results:  none today     ASSESSMENT/PLAN:                                                        ICD-10-CM    1. Type 2 diabetes mellitus without complication, without long-term current use of insulin (H) E11.9 DIABETES EDUCATOR REFERRAL     metFORMIN (GLUCOPHAGE) 500 MG tablet     FOOT EXAM     TSH with free T4 reflex     Albumin Random Urine Quantitative     Creatinine     Hemoglobin A1c   2. Essential hypertension I10    3. Morbid obesity due to excess calories (H) E66.01      -Provided referral for diabetic educator. We discussed the benefits of that.   -Discussed metformin use, potential side effects and anticipated positive effect and its mechanism of action in the body. Discussed the basic diabetic/blood glucose abnormalities in DM.   -Discussed starting with dietary changes slowly  Until then. Continue going to the gym for cardiovascular exercise and weight training  -Return in 3 months for labs, then visit following to discuss progress.     (we will continue to monitor your cholesterol; you are currently not taking medication for that but will possibly  require that due to your diabetic status)      Patient Instructions       Healthy Meals for Diabetes  Ask your healthcare team to help you make a meal plan that fits your needs. Your meal plan tells you when to eat your meals and snacks, what kinds of foods to eat, and how much of each food to eat. You don t have to give up all the foods you like. But you do need to follow some guidelines.  Choose healthy carbohydrates    Starches, sugars, and fiber are all types of carbohydrates. Fiber can help lower your cholesterol and triglycerides. Fiber is also healthy for your heart. You should have 20 to 35 grams of total fiber each day. Fiber-rich foods include:    Whole-grain breads and cereals    Bulgur wheat    Brown rice       Whole-wheat pasta    Fruits and vegetables    Dry beans, and peas   It s important to keep track of the amount of carbohydrates you eat. This can help you keep the right balance of physical activity and medicine. The amount of carbohydrates needed will vary for each person. It depends on many things such as your health, the medicines you take, and how active you are. Your healthcare team will help you figure out the right amount of carbohydrates for you. You may start with around 45 to 60 grams of carbohydrates per meal, depending on your situation.   Here are some examples of foods containing about 15 grams of carbohydrates (1 serving of carbohydrates):    1/2 cup of canned or frozen fruit    A small piece of fresh fruit (4 ounces)    1 slice of bread    1/2 cup of oatmeal    1/3 cup of rice    4 to 6 crackers    1/2 English muffin    1/2 cup of black beans    1/4 of a large baked potato (3 ounces)    2/3 cup of plain fat-free yogurt    1 cup of soup    1/2 cup of casserole    6 chicken nuggets    2-inch square brownie or cake without frosting    2 small cookies    1/2 cup of ice cream or sherbet  Choose healthy protein foods  Eating protein that is low in fat can help you control your weight. It  also helps keep your heart healthy. Low-fat protein foods include:    Fish    Plant proteins, such as dry beans and peas, nuts, and soy products like tofu and soymilk    Lean meat with all visible fat removed    Poultry with the skin removed    Low-fat or nonfat milk, cheese, and yogurt  Limit unhealthy fats and sugar  Saturated and trans fats are unhealthy for your heart. They raise LDL (bad) cholesterol. Fat is also high in calories, so it can make you gain weight. To cut down on unhealthy fats and sugar, limit these foods:    Butter or margarine    Palm and palm kernel oils and coconut oil    Cream    Cheese    Fuentes    Lunch meats       Ice cream    Sweet bakery goods such as pies, muffins, and donuts    Jams and jellies    Candy bars    Regular sodas   How much to eat  The amount of food you eat affects your blood sugar. It also affects your weight. Your health care team will tell you how much of each type of food you should eat.    Use measuring cups and spoons and a food scale to measure serving sizes.    Learn what a correct serving size looks like on your plate. This will help when you are away from home and can t measure your servings.    Eat only the number of servings given on your meal plan for each food. Don t take seconds.    Learn to read food labels. Be sure to look at serving size, total carbohydrates, fiber, calories, sugar, and saturated and trans fats.  When to eat  Your meal plan will likely include breakfast, lunch, dinner, and some snacks.    Try to eat your meals and snacks at about the same times each day.    Eat all your meals and snacks. Skipping a meal or snack can make your blood sugar drop too low. It can also cause you to eat too much at the next meal or snack. Then your blood sugar could get too high.    5010-8048 [employer]. [address] [place] [address], [place] [address]. All rights reserved. This information is not intended as a substitute for professional medical care.  Always follow your healthcare professional's instructions.            Nicole Joy Siegler, PA-C  [facility]

## 2017-04-14 NOTE — MR AVS SNAPSHOT
After Visit Summary   4/14/2017    Al Smith    MRN: [id number]           Patient Information     Date Of Birth          1980        Visit Information        Provider Department      4/14/2017 8:50 AM Siegler, Nicole Joy, PA-C [facility]        Today's Diagnoses     Type 2 diabetes mellitus without complication, without long-term current use of insulin (H)    -  1    Essential hypertension        Morbid obesity due to excess calories (H)          Care Instructions      Healthy Meals for Diabetes  Ask your healthcare team to help you make a meal plan that fits your needs. Your meal plan tells you when to eat your meals and snacks, what kinds of foods to eat, and how much of each food to eat. You don t have to give up all the foods you like. But you do need to follow some guidelines.  Choose healthy carbohydrates    Starches, sugars, and fiber are all types of carbohydrates. Fiber can help lower your cholesterol and triglycerides. Fiber is also healthy for your heart. You should have 20 to 35 grams of total fiber each day. Fiber-rich foods include:    Whole-grain breads and cereals    Bulgur wheat    Brown rice       Whole-wheat pasta    Fruits and vegetables    Dry beans, and peas   It s important to keep track of the amount of carbohydrates you eat. This can help you keep the right balance of physical activity and medicine. The amount of carbohydrates needed will vary for each person. It depends on many things such as your health, the medicines you take, and how active you are. Your healthcare team will help you figure out the right amount of carbohydrates for you. You may start with around 45 to 60 grams of carbohydrates per meal, depending on your situation.   Here are some examples of foods containing about 15 grams of carbohydrates (1 serving of carbohydrates):    1/2 cup of canned or frozen fruit    A small piece of fresh fruit (4 ounces)    1 slice of  bread    1/2 cup of oatmeal    1/3 cup of rice    4 to 6 crackers    1/2 English muffin    1/2 cup of black beans    1/4 of a large baked potato (3 ounces)    2/3 cup of plain fat-free yogurt    1 cup of soup    1/2 cup of casserole    6 chicken nuggets    2-inch square brownie or cake without frosting    2 small cookies    1/2 cup of ice cream or sherbet  Choose healthy protein foods  Eating protein that is low in fat can help you control your weight. It also helps keep your heart healthy. Low-fat protein foods include:    Fish    Plant proteins, such as dry beans and peas, nuts, and soy products like tofu and soymilk    Lean meat with all visible fat removed    Poultry with the skin removed    Low-fat or nonfat milk, cheese, and yogurt  Limit unhealthy fats and sugar  Saturated and trans fats are unhealthy for your heart. They raise LDL (bad) cholesterol. Fat is also high in calories, so it can make you gain weight. To cut down on unhealthy fats and sugar, limit these foods:    Butter or margarine    Palm and palm kernel oils and coconut oil    Cream    Cheese    Fuentes    Lunch meats       Ice cream    Sweet bakery goods such as pies, muffins, and donuts    Jams and jellies    Candy bars    Regular sodas   How much to eat  The amount of food you eat affects your blood sugar. It also affects your weight. Your health care team will tell you how much of each type of food you should eat.    Use measuring cups and spoons and a food scale to measure serving sizes.    Learn what a correct serving size looks like on your plate. This will help when you are away from home and can t measure your servings.    Eat only the number of servings given on your meal plan for each food. Don t take seconds.    Learn to read food labels. Be sure to look at serving size, total carbohydrates, fiber, calories, sugar, and saturated and trans fats.  When to eat  Your meal plan will likely include breakfast, lunch, dinner, and some  snacks.    Try to eat your meals and snacks at about the same times each day.    Eat all your meals and snacks. Skipping a meal or snack can make your blood sugar drop too low. It can also cause you to eat too much at the next meal or snack. Then your blood sugar could get too high.    [phone] [employer]. [address] [place] [address], [place] [address]. All rights reserved. This information is not intended as a substitute for professional medical care. Always follow your healthcare professional's instructions.              Follow-ups after your visit        Additional Services     DIABETES EDUCATOR REFERRAL       DIABETES SELF MANAGEMENT TRAINING (DSMT)      Your provider has referred you to Diabetes Education: FMG: Diabetes Education - All [facility] [phone]   [email or web address]    Type of training and number of hours: New Diagnosis: Initial group DSMT - 10 hours.      Medicare covers: 10 hours of initial DSMT in 12 month period from the time of first visit, plus 2 hours of follow-up DSMT annually, and additional hours as requested for insulin training.    Diabetes Type: Type 2 - On Oral Medication             Diabetes Co-Morbidities: hypertension and obesity               A1C Goal:  <7.0       A1C is: Lab Results       Component                Value               Date                       A1C                      7.4                 04/12/2017              If an urgent visit is needed or A1C is above 12, Care Team to call the Diabetes Education Team at [phone] or send an In Basket message to the Diabetes Education Pool (P DIAB ED-PATIENT CARE).    Diabetes Education Topics: Comprehensive Knowledge Assessment and Instruction    Special Educational Needs Requiring Individual DSMT: None       MEDICAL NUTRITION THERAPY (MNT) for Diabetes    Medical Nutrition Therapy with a Registered Dietitian can be provided in coordination with Diabetes  Self-Management Training to assist in achieving optimal diabetes management.    MNT Type and Hours: New diagnosis: Initial MNT - 3 hours                       Medicare will cover: 3 hours initial MNT in 12 month period after first visit, plus 2 hours of follow-up MNT annually    Please be aware that coverage of these services is subject to the terms and limitations of your health insurance plan.  Call member services at your health plan to determine Diabetes Self-Management Training benefits and ask which blood glucose monitor brands are covered by your plan.      Please bring the following with you to your appointment:    (1)  List of current medications   (2)  List of Blood Glucose Monitor brands that are covered by your insurance plan  (3)  Blood Glucose Monitor and log book  (4)   Food records for the 3 days prior to your visit    The Certified Diabetes Educator may make diabetes medication adjustments per the CDE Protocol and Collaborative Practice Agreement.                  Who to contact     If you have questions or need follow up information about today's clinic visit or your schedule please contact [facility] directly at [phone].  Normal or non-critical lab and imaging results will be communicated to you by [employer], letter or phone within 4 business days after the clinic has received the results. If you do not hear from us within 7 days, please contact the clinic through [employer] or phone. If you have a critical or abnormal lab result, we will notify you by phone as soon as possible.  Submit refill requests through [employer] or call your pharmacy and they will forward the refill request to us. Please allow 3 business days for your refill to be completed.          Additional Information About Your Visit        [employer] Information     [employer] gives you secure access to your electronic health record. If you see a primary care provider, you can also send messages to your care  team and make appointments. If you have questions, please call your primary care clinic.  If you do not have a primary care provider, please call [phone] and they will assist you.        Care EveryWhere ID     This is your Care EveryWhere ID. This could be used by other organizations to access your [facility] medical records  [id number]        Your Vitals Were     Pulse Temperature Respirations Height Pulse Oximetry BMI (Body Mass Index)    85 97.5  F (36.4  C) (Tympanic) 18 6' (1.829 m) 97% 43.67 kg/m2       Blood Pressure from Last 3 Encounters:   04/14/17 136/82   04/12/17 132/86   03/31/17 138/90    Weight from Last 3 Encounters:   04/14/17 (!) 322 lb (146.1 kg)   04/12/17 (!) 326 lb (147.9 kg)   02/15/17 (!) 330 lb (149.7 kg)              We Performed the Following     DIABETES EDUCATOR REFERRAL          Today's Medication Changes          These changes are accurate as of: 4/14/17  9:25 AM.  If you have any questions, ask your nurse or doctor.               Start taking these medicines.        Dose/Directions    metFORMIN 500 MG tablet   Commonly known as:  GLUCOPHAGE   Used for:  Type 2 diabetes mellitus without complication, without long-term current use of insulin (H)   Started by:  Siegler, Nicole Joy, PA-C        Dose:  500 mg   Take 1 tablet (500 mg) by mouth 2 times daily (with meals) Take 1 tab once daily for the first week, then increase to twice daily   Quantity:  60 tablet   Refills:  2            Where to get your medicines      These medications were sent to [place] [id number] [place] - [address]  [address] [place] [address]     Phone:  [phone]     metFORMIN 500 MG tablet                Primary Care Provider Office Phone # Fax #    Jonny Manzanares [phone]       [facility] [address] [facility] [address]        Thank you!     Thank you for choosing [facility]  for your care. Our goal is always to  provide you with excellent care. Hearing back from our patients is one way we can continue to improve our services. Please take a few minutes to complete the written survey that you may receive in the mail after your visit with us. Thank you!             Your Updated Medication List - Protect others around you: Learn how to safely use, store and throw away your medicines at www.disposemymeds.org.          This list is accurate as of: 4/14/17  9:25 AM.  Always use your most recent med list.                   Brand Name Dispense Instructions for use    ADVIL PO      Take by mouth as needed for moderate pain       albuterol 108 (90 BASE) MCG/ACT Inhaler    PROAIR HFA/PROVENTIL HFA/VENTOLIN HFA    1 Inhaler    Inhale 2 puffs into the lungs every 6 hours as needed for shortness of breath / dyspnea or wheezing       fluticasone 50 MCG/ACT spray    FLONASE    1 Bottle    Spray 1-2 sprays into both nostrils daily       hydrochlorothiazide 25 MG tablet    HYDRODIURIL    90 tablet    Take 1 tablet (25 mg) by mouth daily       lisinopril 10 MG tablet    PRINIVIL/ZESTRIL    90 tablet    Take 3 tablets (30 mg) by mouth daily       metFORMIN 500 MG tablet    GLUCOPHAGE    60 tablet    Take 1 tablet (500 mg) by mouth 2 times daily (with meals) Take 1 tab once daily for the first week, then increase to twice daily

## 2017-04-14 NOTE — NURSING NOTE
Chief Complaint   Patient presents with     Hypertension     Hyperglycemia       Initial /82 (BP Location: Left arm, Patient Position: Left side, Cuff Size: Adult Large)  Pulse 85  Temp 97.5  F (36.4  C) (Tympanic)  Resp 18  Ht 6' (1.829 m)  Wt (!) 322 lb (146.1 kg)  SpO2 97%  BMI 43.67 kg/m2 Estimated body mass index is 43.67 kg/(m^2) as calculated from the following:    Height as of this encounter: 6' (1.829 m).    Weight as of this encounter: 322 lb (146.1 kg).  Medication Reconciliation: complete   Keya Mccartney CMA (AAMA)

## 2017-04-19 DIAGNOSIS — I10 ESSENTIAL HYPERTENSION: ICD-10-CM

## 2017-04-19 RX ORDER — HYDROCHLOROTHIAZIDE 25 MG/1
25 TABLET ORAL DAILY
Qty: 90 TABLET | Refills: 2 | Status: SHIPPED | OUTPATIENT
Start: 2017-04-19 | End: 2017-08-15

## 2017-04-19 NOTE — TELEPHONE ENCOUNTER
hydrochlorothiazide (HYDRODIURIL) 25 MG tablet      Last Written Prescription Date: 4/12/17  Last Fill Quantity: 90, # refills: 1  Last Office Visit with [facility], [facility] or [facility] prescribing provider: 4/14/17       Potassium   Date Value Ref Range Status   03/21/2017 4.2 3.4 - 5.3 mmol/L Final     Creatinine   Date Value Ref Range Status   03/21/2017 0.68 0.66 - 1.25 mg/dL Final     BP Readings from Last 3 Encounters:   04/14/17 136/82   04/12/17 132/86   03/31/17 138/90

## 2017-05-01 ENCOUNTER — ALLIED HEALTH/NURSE VISIT (OUTPATIENT)
Dept: EDUCATION SERVICES | Facility: CLINIC | Age: 37
End: 2017-05-01
Payer: COMMERCIAL

## 2017-05-01 DIAGNOSIS — E11.9 TYPE 2 DIABETES MELLITUS WITHOUT COMPLICATION, WITHOUT LONG-TERM CURRENT USE OF INSULIN (H): Primary | ICD-10-CM

## 2017-05-01 PROCEDURE — G0108 DIAB MANAGE TRN  PER INDIV: HCPCS

## 2017-05-01 NOTE — PROGRESS NOTES
Diabetes Self Management Training: Initial Assessment Visit for Newly Diagnosed Patients (Complete AADE Goals Flowsheet)    Al Smith presents today for education related to Type 2 diabetes.    He is accompanied by spouse    Patient's diabetes management related comments/concerns: what to eat    Patient's emotional response to diabetes: expresses readiness to learn and concern for health and well-being    Patient would like this visit to be focused around the following diabetes-related behaviors and goals: Healthy Eating    ASSESSMENT:  Patient Problem List and Family Medical History reviewed for relevant medical history, current medical status, and diabetes risk factors.    Current Diabetes Management per Patient:  Taking diabetes medications?   yes:     Diabetes Medication(s)     Biguanides Sig    metFORMIN (GLUCOPHAGE) 500 MG tablet Take 1 tablet (500 mg) by mouth 2 times daily (with meals) Take 1 tab once daily for the first week, then increase to twice daily      Problems taking diabetes medications regularly? No     Past Diabetes Education: Newly diagnosed    Patient glucose self monitoring as follows: BG meter taught today.     BG values are: unable to assess    Patient's most recent   Lab Results   Component Value Date    A1C 7.4 04/12/2017    is not meeting goal of <7.0    Nutrition:  Patient eats 3 meals per day. Trying to avoid more carbs and have more non-starchy vegetables. Breakfast is the toughest with chasing kids. 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 Has a 4.5 and 2.5 year old who are picky eaters. Travels quite a bit for work and will be gone 3-4 days at a time.     Breakfast - coffee with milk and artificial sweetener and protein shake sometimes and HB eggs  Lunch - comfort food usually (sandwiches, chips, fruit, and baked goods)   Dinner - ground beef tacos OR more grilled foods come summer OR  Eggs and sausage OR homemade pizza with thin crust  and vegetables OR beef and broccoli   Snacks - trying to limit sweets from his mom, handful of nuts, cheese stick    Beverages: avoids pop and juice usually, will have beer or scotch on occasion    Cultural/Hoahaoism diet restrictions: No     Biggest Challenge to Healthy Eating: knowing what to eat and sweet intake    Physical Activity:    Tries to go to gym a couple times per week with wife.    Diabetes Risk Factors:  family history, hypertension, hyperlipidemia, overweight/obesity and inactivity    Diabetes Complications:  Acute Complications: At risk for hypoglycemia? no    Vitals:  There were no vitals taken for this visit.  Estimated body mass index is 43.67 kg/(m^2) as calculated from the following:    Height as of 4/14/17: 1.829 m (6').    Weight as of 4/14/17: 146.1 kg (322 lb).   Last 3 BP:   BP Readings from Last 3 Encounters:   04/14/17 136/82   04/12/17 132/86   03/31/17 138/90       History   Smoking Status     Never Smoker   Smokeless Tobacco     Never Used       Labs:  Lab Results   Component Value Date    A1C 7.4 04/12/2017     Lab Results   Component Value Date     03/21/2017     Lab Results   Component Value Date     03/21/2017     HDL Cholesterol   Date Value Ref Range Status   03/21/2017 32 (L) >39 mg/dL Final   ]  GFR Estimate   Date Value Ref Range Status   03/21/2017 >90  Non  GFR Calc   >60 mL/min/1.7m2 Final     GFR Estimate If Black   Date Value Ref Range Status   03/21/2017 >90   GFR Calc   >60 mL/min/1.7m2 Final     Lab Results   Component Value Date    CR 0.68 03/21/2017     No results found for: MICROALBUMIN    Socio/Economic Considerations:    Support system: spouse/significant other    Health Beliefs and Attitudes:   Patient Activation Measure Survey Score:  No flowsheet data found.    Stage of Change: PREPARATION (Decided to change - considering how)      Diabetes knowledge and skills assessment:     Patient is knowledgeable in diabetes  management concepts related to: new dx    Patient needs further education on the following diabetes management concepts: Healthy Eating, Being Active, Monitoring, Taking Medication, Problem Solving, Reducing Risks and Healthy Coping    Barriers to Learning Assessment: No Barriers identified    Based on learning assessment above, most appropriate setting for further diabetes education would be: Group class or Individual setting.    INTERVENTION:   Education provided today on:  AADE Self-Care Behaviors:  Healthy Eating: carbohydrate counting, consistency in amount, composition, and timing of food intake, portion control and label reading. His wife had quite a few diet questions as she grocery shops and cooks dinner so discussed those. Educated on healthy snack options for when traveling for work.   Being Active: relationship to blood glucose  Monitoring: purpose, proper technique, log and interpret results, individual blood glucose targets, frequency of monitoring and proper sharps disposal  Taking Medication: action of prescribed medication, side effects of prescribed medications and when to take medications  Problem Solving: high blood glucose - causes, signs/symptoms, treatment and prevention and sick day arrangements  Patient was instructed on One Touch Verio Flex meter and was able to provide an accurate return demonstration. Patient's blood glucose reading today was 119 mg/dL.    Opportunities for ongoing education and support in diabetes-self management were discussed.    Pt verbalized understanding of concepts discussed and recommendations provided today.       Education Materials Provided:  Tj Understanding Diabetes Booklet and One Touch Verio Flex meter kit    PLAN:  Meal Plan Recommendation: eat 3 meals a day, have small snacks between meals, if needed, use portion control and Aim for 4 carb servings at meals and 1 carb servings at snacks  Exercise / activity plan: Try to continue to go to the gym a  few days per week and include activity on off days from the gym.   Check blood sugars fasting  Keep a blood glucose record for next visit.    FOLLOW-UP:  Follow-up appointment scheduled on 6/16/17.  Education topics to cover at the next diabetes education visit(s): complication prevention, heart health  Chart routed to referring provider.    Ongoing plan for education and support: Written resources (magazines, books, etc.) and Follow-up visit with diabetes educator in 6 weeks    JAMAL Coronado CDE    Time Spent: 90 minutes  Encounter Type: Individual    Any diabetes medication dose changes were made via the CDE Protocol and Collaborative Practice Agreement with the patient's referring provider. A copy of this encounter was shared with the provider.

## 2017-05-01 NOTE — MR AVS SNAPSHOT
After Visit Summary   5/1/2017    Al Smith    MRN: [id number]           Patient Information     Date Of Birth          1980        Visit Information        Provider Department      5/1/2017 1:30 PM  DIABETIC ED RESOURCE [facility]        Today's Diagnoses     Type 2 diabetes mellitus without complication, without long-term current use of insulin (H)    -  1       Follow-ups after your visit        Your next 10 appointments already scheduled     Jun 16, 2017 12:30 PM CDT   Diabetic Education with  DI[facility] ED [facility]   [facility]    [address] [id number] [phone]              Who to contact     If you have questions or need follow up information about today's clinic visit or your schedule please contact [facility] directly at [phone].  Normal or non-critical lab and imaging results will be communicated to you by MyChart, letter or phone within 4 business days after the clinic has received the results. If you do not hear from us within 7 days, please contact the clinic through [employer] or phone. If you have a critical or abnormal lab result, we will notify you by phone as soon as possible.  Submit refill requests through [employer] or call your pharmacy and they will forward the refill request to us. Please allow 3 business days for your refill to be completed.          Additional Information About Your Visit        MyChart Information     [employer] gives you secure access to your electronic health record. If you see a primary care provider, you can also send messages to your care team and make appointments. If you have questions, please call your primary care clinic.  If you do not have a primary care provider, please call [phone] and they will assist you.        Care EveryWhere ID     This is your Care EveryWhere ID. This could be used by  other organizations to access your [facility] medical records  [id number]         Blood Pressure from Last 3 Encounters:   04/14/17 136/82   04/12/17 132/86   03/31/17 138/90    Weight from Last 3 Encounters:   04/14/17 (!) 146.1 kg (322 lb)   04/12/17 (!) 147.9 kg (326 lb)   02/15/17 (!) 149.7 kg (330 lb)              We Performed the Following     DIABETES EDUCATION - Individual  []          Today's Medication Changes          These changes are accurate as of: 5/1/17  3:28 PM.  If you have any questions, ask your nurse or doctor.               Start taking these medicines.        Dose/Directions    blood glucose monitoring lancets   Used for:  Type 2 diabetes mellitus without complication, without long-term current use of insulin (H)        Use to test blood sugar 1 times daily or as directed.  Ok to substitute alternative if insurance prefers.   Quantity:  1 Box   Refills:  6       blood glucose monitoring test strip   Commonly known as:  ONE TOUCH VERIO IQ   Used for:  Type 2 diabetes mellitus without complication, without long-term current use of insulin (H)        Use to test blood sugars 1 times daily or as directed.   Quantity:  50 each   Refills:  6            Where to get your medicines      These medications were sent to [id number] - [place], [place]  [address] [address]     Phone:  [phone]     blood glucose monitoring lancets    blood glucose monitoring test strip                Primary Care Provider Office Phone # Fax #    Jonny Manzanares [phone]       [facility] [address] [facility] [address]        Thank you!     Thank you for choosing [facility]  for your care. Our goal is always to provide you with excellent care. Hearing back from our patients is one way we can continue to improve our services. Please take a few minutes to complete the written survey that you may  receive in the mail after your visit with us. Thank you!             Your Updated Medication List - Protect others around you: Learn how to safely use, store and throw away your medicines at www.disposemymeds.org.          This list is accurate as of: 5/1/17  3:28 PM.  Always use your most recent med list.                   Brand Name Dispense Instructions for use    ADVIL PO      Take by mouth as needed for moderate pain       albuterol 108 (90 BASE) MCG/ACT Inhaler    PROAIR HFA/PROVENTIL HFA/VENTOLIN HFA    1 Inhaler    Inhale 2 puffs into the lungs every 6 hours as needed for shortness of breath / dyspnea or wheezing       blood glucose monitoring lancets     1 Box    Use to test blood sugar 1 times daily or as directed.  Ok to substitute alternative if insurance prefers.       blood glucose monitoring test strip    ONE TOUCH VERIO IQ    50 each    Use to test blood sugars 1 times daily or as directed.       fluticasone 50 MCG/ACT spray    FLONASE    1 Bottle    Spray 1-2 sprays into both nostrils daily       * hydrochlorothiazide 25 MG tablet    HYDRODIURIL    90 tablet    Take 1 tablet (25 mg) by mouth daily       * hydrochlorothiazide 25 MG tablet    HYDRODIURIL    90 tablet    Take 1 tablet (25 mg) by mouth daily       lisinopril 10 MG tablet    PRINIVIL/ZESTRIL    90 tablet    Take 3 tablets (30 mg) by mouth daily       metFORMIN 500 MG tablet    GLUCOPHAGE    60 tablet    Take 1 tablet (500 mg) by mouth 2 times daily (with meals) Take 1 tab once daily for the first week, then increase to twice daily       * Notice:  This list has 2 medication(s) that are the same as other medications prescribed for you. Read the directions carefully, and ask your doctor or other care provider to review them with you.

## 2017-06-11 DIAGNOSIS — E11.9 TYPE 2 DIABETES MELLITUS WITHOUT COMPLICATION, WITHOUT LONG-TERM CURRENT USE OF INSULIN (H): ICD-10-CM

## 2017-06-12 DIAGNOSIS — I10 ESSENTIAL HYPERTENSION: ICD-10-CM

## 2017-06-13 NOTE — TELEPHONE ENCOUNTER
METFORMIN  MG TABLET      Last Written Prescription Date: 04/14/2017  Last Fill Quantity: 60, # refills: 2  Last Office Visit with G, [facility] or [facility] prescribing provider:  04/14/2017        BP Readings from Last 3 Encounters:   04/14/17 136/82   04/12/17 132/86   03/31/17 138/90     No results found for: MICROL  No results found for: UMALCR  Creatinine   Date Value Ref Range Status   03/21/2017 0.68 0.66 - 1.25 mg/dL Final   ]  GFR Estimate   Date Value Ref Range Status   03/21/2017 >90  Non  GFR Calc   >60 mL/min/1.7m2 Final     GFR Estimate If Black   Date Value Ref Range Status   03/21/2017 >90   GFR Calc   >60 mL/min/1.7m2 Final     Lab Results   Component Value Date    CHOL 183 03/21/2017     Lab Results   Component Value Date    HDL 32 03/21/2017     Lab Results   Component Value Date     03/21/2017     Lab Results   Component Value Date    TRIG 255 03/21/2017     No results found for: CHOLHDLRATIO  Lab Results   Component Value Date    AST 21 03/21/2017     Lab Results   Component Value Date    ALT 50 03/21/2017     Lab Results   Component Value Date    A1C 7.4 04/12/2017     Potassium   Date Value Ref Range Status   03/21/2017 4.2 3.4 - 5.3 mmol/L Final

## 2017-06-13 NOTE — TELEPHONE ENCOUNTER
lisinopril (PRINIVIL/ZESTRIL) 10 MG tablet      Last Written Prescription Date: 4/12/17  Last Fill Quantity: 90, # refills: 1  Last Office Visit with G, P or [facility] prescribing provider: 4/14/17       Potassium   Date Value Ref Range Status   03/21/2017 4.2 3.4 - 5.3 mmol/L Final     Creatinine   Date Value Ref Range Status   03/21/2017 0.68 0.66 - 1.25 mg/dL Final     BP Readings from Last 3 Encounters:   04/14/17 136/82   04/12/17 132/86   03/31/17 138/90

## 2017-06-14 RX ORDER — LISINOPRIL 10 MG/1
TABLET ORAL
Qty: 90 TABLET | Refills: 8 | Status: SHIPPED | OUTPATIENT
Start: 2017-06-14 | End: 2017-08-15

## 2017-06-16 ENCOUNTER — ALLIED HEALTH/NURSE VISIT (OUTPATIENT)
Dept: EDUCATION SERVICES | Facility: CLINIC | Age: 37
End: 2017-06-16
Payer: COMMERCIAL

## 2017-06-16 VITALS — WEIGHT: 300.1 LBS | BODY MASS INDEX: 40.7 KG/M2

## 2017-06-16 DIAGNOSIS — E11.9 TYPE 2 DIABETES MELLITUS WITHOUT COMPLICATION, WITHOUT LONG-TERM CURRENT USE OF INSULIN (H): Primary | ICD-10-CM

## 2017-06-16 PROCEDURE — G0108 DIAB MANAGE TRN  PER INDIV: HCPCS

## 2017-06-16 NOTE — Clinical Note
Alexis Valdes,  Just fyi - patient doing very well. BG's are in target and he has lost >30#.  Initial DM education completed.   Thanks! Apryl Fournier RD, CDE Diabetes

## 2017-06-16 NOTE — MR AVS SNAPSHOT
After Visit Summary   6/16/2017    Al Smith    MRN: [id number]           Patient Information     Date Of Birth          1980        Visit Information        Provider Department      6/16/2017 12:30 PM  DIABETIC ED RESOURCE [facility]        Today's Diagnoses     Type 2 diabetes mellitus without complication, without long-term current use of insulin (H)    -  1       Follow-ups after your visit        Who to contact     If you have questions or need follow up information about today's clinic visit or your schedule please contact [facility] directly at [phone].  Normal or non-critical lab and imaging results will be communicated to you by [employer], letter or phone within 4 business days after the clinic has received the results. If you do not hear from us within 7 days, please contact the clinic through [employer] or phone. If you have a critical or abnormal lab result, we will notify you by phone as soon as possible.  Submit refill requests through "[employer] or call your pharmacy and they will forward the refill request to us. Please allow 3 business days for your refill to be completed.          Additional Information About Your Visit        MyChart Information     "[employer] gives you secure access to your electronic health record. If you see a primary care provider, you can also send messages to your care team and make appointments. If you have questions, please call your primary care clinic.  If you do not have a primary care provider, please call [phone] and they will assist you.        Care EveryWhere ID     This is your Care EveryWhere ID. This could be used by other organizations to access your [facility] medical records  [id number]        Your Vitals Were     BMI (Body Mass Index)                   40.7 kg/m2            Blood Pressure from Last 3 Encounters:   04/14/17 136/82   04/12/17 132/86   03/31/17 138/90    Weight from  Last 3 Encounters:   06/16/17 (!) 136.1 kg (300 lb 1.6 oz)   04/14/17 (!) 146.1 kg (322 lb)   04/12/17 (!) 147.9 kg (326 lb)              We Performed the Following     DIABETES EDUCATION - Individual  []        Primary Care Provider Office Phone # Fax #    Jonny Manzanares [phone]       [facility] [address]  [facility] [address]        Thank you!     Thank you for choosing [facility]  for your care. Our goal is always to provide you with excellent care. Hearing back from our patients is one way we can continue to improve our services. Please take a few minutes to complete the written survey that you may receive in the mail after your visit with us. Thank you!             Your Updated Medication List - Protect others around you: Learn how to safely use, store and throw away your medicines at www.disposemymeds.org.          This list is accurate as of: 6/16/17  1:18 PM.  Always use your most recent med list.                   Brand Name Dispense Instructions for use    ADVIL PO      Take by mouth as needed for moderate pain       albuterol 108 (90 BASE) MCG/ACT Inhaler    PROAIR HFA/PROVENTIL HFA/VENTOLIN HFA    1 Inhaler    Inhale 2 puffs into the lungs every 6 hours as needed for shortness of breath / dyspnea or wheezing       blood glucose monitoring lancets     1 Box    Use to test blood sugar 1 times daily or as directed.  Ok to substitute alternative if insurance prefers.       blood glucose monitoring test strip    ONE TOUCH VERIO IQ    50 each    Use to test blood sugars 1 times daily or as directed.       fluticasone 50 MCG/ACT spray    FLONASE    1 Bottle    Spray 1-2 sprays into both nostrils daily       * hydrochlorothiazide 25 MG tablet    HYDRODIURIL    90 tablet    Take 1 tablet (25 mg) by mouth daily       * hydrochlorothiazide 25 MG tablet    HYDRODIURIL    90 tablet    Take 1 tablet (25 mg) by mouth daily       lisinopril  10 MG tablet    PRINIVIL/ZESTRIL    90 tablet    TAKE 3 TABLETS (30MG) BY MOUTH DAILY       metFORMIN 500 MG tablet    GLUCOPHAGE    60 tablet    TAKE 1 TABLET BY MOUTH ONCE DAILY FOR 7 DAYS,THEN INCREASE TO 1 TAB BY MOUTH TWICE DAILY THEREAFTER       * Notice:  This list has 2 medication(s) that are the same as other medications prescribed for you. Read the directions carefully, and ask your doctor or other care provider to review them with you.

## 2017-06-16 NOTE — NURSING NOTE
Diabetes Self Management Training: Follow-up Visit    Al Smith presents today for education and evaluation of glucose control related to Type 2 diabetes.    He is accompanied by self    Patient's diabetes management related comments/concerns:  Reports doing well    Patient would like this visit to be focused around the following diabetes-related behaviors and goals: Taking Medication    ASSESSMENT:  Patient Problem List reviewed for relevant medical history and current medical status.    Is eating better at home, and watching portions when dining out/travelling. Is also going to gym regularly. Has lost >30# since DM dx. Has question about what he can take OTC for seasonal allergies, that will not affect his diabetes.     Current Diabetes Management per Patient:      Diabetes Medication(s)     Biguanides Sig    metFORMIN (GLUCOPHAGE) 500 MG tablet TAKE 1 TABLET BY MOUTH ONCE DAILY FOR 7 DAYS,THEN INCREASE TO 1 TAB BY MOUTH TWICE DAILY THEREAFTER        Problems taking diabetes medications regularly? No , Side effects? No     Patient glucose self monitoring as follows: one time daily.   BG meter: One Touch Verio Flex meter  BG results:          BG values are: In goal  Patient's most recent   Lab Results   Component Value Date    A1C 7.4 04/12/2017    is not meeting goal of <7.0. However, this result was at time of dx.    Nutrition:  Patient eats 3 meals per meal, is watching portions, is aware of carbs-aims for 4 carb choices or less per meal.     Physical Activity:    Limitations: none  Going to the gym 3x/week    Diabetes Complications:  Chronic Complication Prevention: discussed at today's visit    Vitals:  300 lbs 1.6 oz    Body mass index is 40.7 kg/(m^2).    Wt Readings from Last 3 Encounters:   06/16/17 (!) 136.1 kg (300 lb 1.6 oz)   04/14/17 (!) 146.1 kg (322 lb)   04/12/17 (!) 147.9 kg (326 lb)     Last 3 BP:   BP Readings from Last 3 Encounters:   04/14/17 136/82   04/12/17 132/86   03/31/17 138/90        History   Smoking Status     Never Smoker   Smokeless Tobacco     Never Used       Labs:  Lab Results   Component Value Date    A1C 7.4 04/12/2017     Lab Results   Component Value Date     03/21/2017     Lab Results   Component Value Date     03/21/2017     HDL Cholesterol   Date Value Ref Range Status   03/21/2017 32 (L) >39 mg/dL Final   ]  GFR Estimate   Date Value Ref Range Status   03/21/2017 >90  Non  GFR Calc   >60 mL/min/1.7m2 Final     GFR Estimate If Black   Date Value Ref Range Status   03/21/2017 >90   GFR Calc   >60 mL/min/1.7m2 Final     Lab Results   Component Value Date    CR 0.68 03/21/2017     No results found for: MICROALBUMIN    Health Beliefs and Attitudes:   Patient Activation Measure Survey Score:  No flowsheet data found.    Stage of Change: ACTION (Actively working towards change)    Progress toward meeting diabetes-related behavioral goals:    GOALS % Met Goal   Healthy Eating  100   Physical Activity 100   Monitoring 100   Medication Taking 100       Diabetes knowledge and skills assessment:     Patient is knowledgeable in diabetes management concepts related to: Healthy Eating, Being Active, Monitoring and Taking Medication    Patient needs further education on the following diabetes management concepts: Problem Solving, Reducing Risks and Healthy Coping    Barriers to Learning Assessment: No Barriers identified    Based on learning assessment above, most appropriate setting for further diabetes education would be: Individual setting.    INTERVENTION:  Education provided today on:  AADE Self-Care Behaviors:  Taking Medication: defer patient question about OTC allergy medications to pharmacist  Problem Solving: high blood glucose - causes, signs/symptoms, treatment and prevention, when to call health care provider and sick day arrangements  Reducing Risks: prevention, early diagnostic measures and treatment of complications, foot care,  annual eye exam, A1C - goals, relating to blood glucose levels, how often to check, lipids levels and goals and blood pressure and goals  Healthy Coping: benefits of making appropriate lifestyle changes    Opportunities for ongoing education and support in diabetes-self management were discussed.    Pt verbalized understanding of concepts discussed and recommendations provided today.       Education Materials Provided:  No new materials provided today    PLAN:  No changes in the patient's current treatment plan  Continue current lifestyle efforts.     FOLLOW-UP:  Follow-up with PCP recommended.  CDE follow-up as needed. Send Alex and Ani message with updates as discussed.  Follow-up yearly for diabetes education review.  Chart routed to referring provider.    Ongoing plan for education and support: Smartphone Margarito(s), Exercise program, Written resources (magazines, books, etc.), Follow-up visit with diabetes educator  and Follow-up with primary care provider    Apryl Fournier RD, CDE  Diabetes     Time Spent: 30 minutes  Encounter Type: Individual    Any diabetes medication dose changes were made via the CDE Protocol and Collaborative Practice Agreement with the patient's primary care provider. A copy of this encounter was shared with the provider.

## 2017-07-17 DIAGNOSIS — E11.9 TYPE 2 DIABETES MELLITUS WITHOUT COMPLICATION, WITHOUT LONG-TERM CURRENT USE OF INSULIN (H): ICD-10-CM

## 2017-07-17 LAB
CREAT SERPL-MCNC: 0.76 MG/DL (ref 0.66–1.25)
CREAT UR-MCNC: 104 MG/DL
GFR SERPL CREATININE-BSD FRML MDRD: NORMAL ML/MIN/1.7M2
HBA1C MFR BLD: 5.7 % (ref 4.3–6)
MICROALBUMIN UR-MCNC: 9 MG/L
MICROALBUMIN/CREAT UR: 8.58 MG/G CR (ref 0–17)
TSH SERPL DL<=0.005 MIU/L-ACNC: 0.86 MU/L (ref 0.4–4)

## 2017-07-17 PROCEDURE — 83036 HEMOGLOBIN GLYCOSYLATED A1C: CPT | Performed by: PHYSICIAN ASSISTANT

## 2017-07-17 PROCEDURE — 82565 ASSAY OF CREATININE: CPT | Performed by: PHYSICIAN ASSISTANT

## 2017-07-17 PROCEDURE — 36415 COLL VENOUS BLD VENIPUNCTURE: CPT | Performed by: PHYSICIAN ASSISTANT

## 2017-07-17 PROCEDURE — 82043 UR ALBUMIN QUANTITATIVE: CPT | Performed by: PHYSICIAN ASSISTANT

## 2017-07-17 PROCEDURE — 84443 ASSAY THYROID STIM HORMONE: CPT | Performed by: PHYSICIAN ASSISTANT

## 2017-08-09 ENCOUNTER — MYC MEDICAL ADVICE (OUTPATIENT)
Dept: FAMILY MEDICINE | Facility: CLINIC | Age: 37
End: 2017-08-09

## 2017-08-09 DIAGNOSIS — I10 ESSENTIAL HYPERTENSION: ICD-10-CM

## 2017-08-09 DIAGNOSIS — E11.9 TYPE 2 DIABETES MELLITUS WITHOUT COMPLICATION, WITHOUT LONG-TERM CURRENT USE OF INSULIN (H): ICD-10-CM

## 2017-08-15 RX ORDER — LISINOPRIL 10 MG/1
TABLET ORAL
Qty: 270 TABLET | Refills: 2 | Status: SHIPPED | OUTPATIENT
Start: 2017-08-15 | End: 2018-05-11

## 2017-08-15 RX ORDER — HYDROCHLOROTHIAZIDE 25 MG/1
25 TABLET ORAL DAILY
Qty: 90 TABLET | Refills: 2 | Status: SHIPPED | OUTPATIENT
Start: 2017-08-15 | End: 2018-05-11

## 2017-08-15 NOTE — TELEPHONE ENCOUNTER
Test strips and lancets      Last Written Prescription Date: 5-1-17  Last Fill Quantity: 1 box  # refills: 2-6   Last Office Visit [place] [facility] or [facility] prescribing provider: 4-14-17                                             Lisinopril and HCTZ      Last Written Prescription Date: 4- and 6  Last Fill Quantity: 90, # refills: 2  Last Office Visit with [facility] or [facility] prescribing provider: 4-14-17       Potassium   Date Value Ref Range Status   03/21/2017 4.2 3.4 - 5.3 mmol/L Final     Creatinine   Date Value Ref Range Status   07/17/2017 0.76 0.66 - 1.25 mg/dL Final     BP Readings from Last 3 Encounters:   04/14/17 136/82   04/12/17 132/86   03/31/17 138/90

## 2018-01-17 ENCOUNTER — OFFICE VISIT (OUTPATIENT)
Dept: FAMILY MEDICINE | Facility: CLINIC | Age: 38
End: 2018-01-17
Payer: COMMERCIAL

## 2018-01-17 VITALS
WEIGHT: 307 LBS | TEMPERATURE: 97.3 F | HEART RATE: 78 BPM | SYSTOLIC BLOOD PRESSURE: 136 MMHG | RESPIRATION RATE: 16 BRPM | BODY MASS INDEX: 41.58 KG/M2 | DIASTOLIC BLOOD PRESSURE: 88 MMHG | HEIGHT: 72 IN

## 2018-01-17 DIAGNOSIS — J30.2 SEASONAL ALLERGIC RHINITIS, UNSPECIFIED CHRONICITY, UNSPECIFIED TRIGGER: ICD-10-CM

## 2018-01-17 DIAGNOSIS — E66.01 MORBID OBESITY (H): ICD-10-CM

## 2018-01-17 DIAGNOSIS — E78.2 MIXED HYPERLIPIDEMIA: ICD-10-CM

## 2018-01-17 DIAGNOSIS — E11.9 TYPE 2 DIABETES MELLITUS WITHOUT COMPLICATION, WITHOUT LONG-TERM CURRENT USE OF INSULIN (H): Primary | ICD-10-CM

## 2018-01-17 DIAGNOSIS — I10 ESSENTIAL HYPERTENSION: ICD-10-CM

## 2018-01-17 LAB
ALBUMIN SERPL-MCNC: 4.3 G/DL (ref 3.4–5)
ALBUMIN UR-MCNC: NEGATIVE MG/DL
ALP SERPL-CCNC: 58 U/L (ref 40–150)
ALT SERPL W P-5'-P-CCNC: 46 U/L (ref 0–70)
ANION GAP SERPL CALCULATED.3IONS-SCNC: 8 MMOL/L (ref 3–14)
APPEARANCE UR: CLEAR
AST SERPL W P-5'-P-CCNC: 26 U/L (ref 0–45)
BASOPHILS # BLD AUTO: 0 10E9/L (ref 0–0.2)
BASOPHILS NFR BLD AUTO: 0.1 %
BILIRUB SERPL-MCNC: 0.8 MG/DL (ref 0.2–1.3)
BILIRUB UR QL STRIP: NEGATIVE
BUN SERPL-MCNC: 18 MG/DL (ref 7–30)
CALCIUM SERPL-MCNC: 9 MG/DL (ref 8.5–10.1)
CHLORIDE SERPL-SCNC: 100 MMOL/L (ref 94–109)
CHOLEST SERPL-MCNC: 198 MG/DL
CO2 SERPL-SCNC: 27 MMOL/L (ref 20–32)
COLOR UR AUTO: YELLOW
CREAT SERPL-MCNC: 0.76 MG/DL (ref 0.66–1.25)
CREAT UR-MCNC: 191 MG/DL
DIFFERENTIAL METHOD BLD: ABNORMAL
EOSINOPHIL # BLD AUTO: 0.3 10E9/L (ref 0–0.7)
EOSINOPHIL NFR BLD AUTO: 3.9 %
ERYTHROCYTE [DISTWIDTH] IN BLOOD BY AUTOMATED COUNT: 13.4 % (ref 10–15)
GFR SERPL CREATININE-BSD FRML MDRD: >90 ML/MIN/1.7M2
GLUCOSE SERPL-MCNC: 118 MG/DL (ref 70–99)
GLUCOSE UR STRIP-MCNC: NEGATIVE MG/DL
HBA1C MFR BLD: 5.8 % (ref 4.3–6)
HCT VFR BLD AUTO: 47.9 % (ref 40–53)
HDLC SERPL-MCNC: 38 MG/DL
HGB BLD-MCNC: 16.7 G/DL (ref 13.3–17.7)
HGB UR QL STRIP: NEGATIVE
KETONES UR STRIP-MCNC: NEGATIVE MG/DL
LDLC SERPL CALC-MCNC: 128 MG/DL
LEUKOCYTE ESTERASE UR QL STRIP: NEGATIVE
LYMPHOCYTES # BLD AUTO: 1.3 10E9/L (ref 0.8–5.3)
LYMPHOCYTES NFR BLD AUTO: 20 %
MCH RBC QN AUTO: 30.7 PG (ref 26.5–33)
MCHC RBC AUTO-ENTMCNC: 34.9 G/DL (ref 31.5–36.5)
MCV RBC AUTO: 88 FL (ref 78–100)
MICROALBUMIN UR-MCNC: 15 MG/L
MICROALBUMIN/CREAT UR: 7.75 MG/G CR (ref 0–17)
MONOCYTES # BLD AUTO: 0.6 10E9/L (ref 0–1.3)
MONOCYTES NFR BLD AUTO: 8.2 %
NEUTROPHILS # BLD AUTO: 4.5 10E9/L (ref 1.6–8.3)
NEUTROPHILS NFR BLD AUTO: 67.8 %
NITRATE UR QL: NEGATIVE
NONHDLC SERPL-MCNC: 160 MG/DL
PH UR STRIP: 6 PH (ref 5–7)
PLATELET # BLD AUTO: 144 10E9/L (ref 150–450)
POTASSIUM SERPL-SCNC: 3.9 MMOL/L (ref 3.4–5.3)
PROT SERPL-MCNC: 7.8 G/DL (ref 6.8–8.8)
RBC # BLD AUTO: 5.44 10E12/L (ref 4.4–5.9)
RBC #/AREA URNS AUTO: NORMAL /HPF
SODIUM SERPL-SCNC: 135 MMOL/L (ref 133–144)
SOURCE: NORMAL
SP GR UR STRIP: 1.02 (ref 1–1.03)
TRIGL SERPL-MCNC: 161 MG/DL
UROBILINOGEN UR STRIP-ACNC: 0.2 EU/DL (ref 0.2–1)
WBC # BLD AUTO: 6.7 10E9/L (ref 4–11)
WBC #/AREA URNS AUTO: NORMAL /HPF

## 2018-01-17 PROCEDURE — 85025 COMPLETE CBC W/AUTO DIFF WBC: CPT | Performed by: FAMILY MEDICINE

## 2018-01-17 PROCEDURE — 36415 COLL VENOUS BLD VENIPUNCTURE: CPT | Performed by: FAMILY MEDICINE

## 2018-01-17 PROCEDURE — 80061 LIPID PANEL: CPT | Performed by: FAMILY MEDICINE

## 2018-01-17 PROCEDURE — 99214 OFFICE O/P EST MOD 30 MIN: CPT | Performed by: FAMILY MEDICINE

## 2018-01-17 PROCEDURE — 82043 UR ALBUMIN QUANTITATIVE: CPT | Performed by: FAMILY MEDICINE

## 2018-01-17 PROCEDURE — 81001 URINALYSIS AUTO W/SCOPE: CPT | Performed by: FAMILY MEDICINE

## 2018-01-17 PROCEDURE — 83036 HEMOGLOBIN GLYCOSYLATED A1C: CPT | Performed by: FAMILY MEDICINE

## 2018-01-17 PROCEDURE — 80053 COMPREHEN METABOLIC PANEL: CPT | Performed by: FAMILY MEDICINE

## 2018-01-17 NOTE — NURSING NOTE
Chief Complaint   Patient presents with     Diabetes     Hypertension       Initial /88  Pulse 78  Temp 97.3  F (36.3  C) (Tympanic)  Resp 16  Ht 6' (1.829 m)  Wt (!) 307 lb (139.3 kg)  BMI 41.64 kg/m2 Estimated body mass index is 41.64 kg/(m^2) as calculated from the following:    Height as of this encounter: 6' (1.829 m).    Weight as of this encounter: 307 lb (139.3 kg).  Medication Reconciliation: completecomplete    Amy Buckley, CMA

## 2018-01-17 NOTE — PROGRESS NOTES
SUBJECTIVE:   Al Smith is a 37 year old male who presents to clinic today for the following health issues:      Diabetes Follow-up    Patient is checking blood sugars: once daily.  Results are as follows:       Average around 110's        Diabetic concerns: None     Symptoms of hypoglycemia (low blood sugar): none     Paresthesias (numbness or burning in feet) or sores: No     Date of last diabetic eye exam: 12/2017  BP Readings from Last 2 Encounters:   01/17/18 136/88   04/14/17 136/82     Hemoglobin A1C (%)   Date Value   01/17/2018 5.8   07/17/2017 5.7     LDL Cholesterol Calculated (mg/dL)   Date Value   03/21/2017 100 (H)     Hypertension Follow-up      Outpatient blood pressures are not being checked.    Low Salt Diet: low salt          Amount of exercise or physical activity: 1-2 day/week for an average of 15-30 minutes    Problems taking medications regularly: No    Medication side effects: none  Diet: regular (no restrictions)          Problem list and histories reviewed & adjusted, as indicated.  Additional history: as documented    Patient Active Problem List   Diagnosis     Morbid obesity (H)     Seasonal allergic rhinitis     Mixed hyperlipidemia     Essential hypertension     Other male erectile dysfunction     Type 2 diabetes mellitus without complication, without long-term current use of insulin (H)     Past Surgical History:   Procedure Laterality Date     ORTHOPEDIC SURGERY         Social History   Substance Use Topics     Smoking status: Never Smoker     Smokeless tobacco: Never Used     Alcohol use Yes      Comment: socially     Family History   Problem Relation Age of Onset     DIABETES Father      Arthritis Mother              Reviewed and updated as needed this visit by clinical staffTobacco  Allergies  Meds  Med Hx  Surg Hx  Fam Hx  Soc Hx      Reviewed and updated as needed this visit by Provider         ROS:  Constitutional, neuro, ENT, endocrine, pulmonary, cardiac,  gastrointestinal, genitourinary, musculoskeletal, integument and psychiatric systems are negative, except as otherwise noted.      OBJECTIVE:                                                    /88  Pulse 78  Temp 97.3  F (36.3  C) (Tympanic)  Resp 16  Ht 6' (1.829 m)  Wt (!) 307 lb (139.3 kg)  BMI 41.64 kg/m2  Body mass index is 41.64 kg/(m^2).  GENERAL APPEARANCE: healthy, alert and no distress         ASSESSMENT/PLAN:                                                        ICD-10-CM    1. Type 2 diabetes mellitus without complication, without long-term current use of insulin (H) E11.9 Albumin Random Urine Quantitative with Creat Ratio     Hemoglobin A1c   2. Essential hypertension I10 UA with Microscopic     CBC with platelets differential     Comprehensive metabolic panel   3. Mixed hyperlipidemia E78.2 Lipid Profile   4. Morbid obesity (H) E66.01    5. Seasonal allergic rhinitis, unspecified chronicity, unspecified trigger J30.2        Patient Instructions   We will check labs as noted.  I will increase his lisinopril to 40 mg daily.  He will follow-up in 1 month for a blood pressure check.  He has started watching his diet a little bit more closely and attempting to get back into exercising.  He does not need refills of his medications at the present time.      Jonny Manzanares MD  [facility]

## 2018-01-17 NOTE — MR AVS SNAPSHOT
After Visit Summary   1/17/2018    Al Smith    MRN: [id number]           Patient Information     Date Of Birth          1980        Visit Information        Provider Department      1/17/2018 9:00 AM Jonny Manzanares MD [facility]        Today's Diagnoses     Type 2 diabetes mellitus without complication, without long-term current use of insulin (H)    -  1    Essential hypertension        Mixed hyperlipidemia        Morbid obesity (H)        Seasonal allergic rhinitis, unspecified chronicity, unspecified trigger          Care Instructions    We will check labs as noted.  I will increase his lisinopril to 40 mg daily.  He will follow-up in 1 month for a blood pressure check.  He has started watching his diet a little bit more closely and attempting to get back into exercising.  He does not need refills of his medications at the present time.          Follow-ups after your visit        Your next 10 appointments already scheduled     Feb 19, 2018  9:15 AM CST   SHORT with Jonny Manzanares MD   [facility]    [address] [place] [address] [id number] [phone]              Who to contact     If you have questions or need follow up information about today's clinic visit or your schedule please contact [facility] directly at [phone].  Normal or non-critical lab and imaging results will be communicated to you by MyChart, letter or phone within 4 business days after the clinic has received the results. If you do not hear from us within 7 days, please contact the clinic through MyChart or phone. If you have a critical or abnormal lab result, we will notify you by phone as soon as possible.  Submit refill requests through "[employer] or call your pharmacy and they will forward the refill request to us. Please allow 3 business  days for your refill to be completed.          Additional Information About Your Visit        MyChart Information     [employer] gives you secure access to your electronic health record. If you see a primary care provider, you can also send messages to your care team and make appointments. If you have questions, please call your primary care clinic.  If you do not have a primary care provider, please call [phone] and they will assist you.        Care EveryWhere ID     This is your Care EveryWhere ID. This could be used by other organizations to access your [facility] medical records  [id number]        Your Vitals Were     Pulse Temperature Respirations Height BMI (Body Mass Index)       78 97.3  F (36.3  C) (Tympanic) 16 6' (1.829 m) 41.64 kg/m2        Blood Pressure from Last 3 Encounters:   01/17/18 136/88   04/14/17 136/82   04/12/17 132/86    Weight from Last 3 Encounters:   01/17/18 (!) 307 lb (139.3 kg)   06/16/17 (!) 300 lb 1.6 oz (136.1 kg)   04/14/17 (!) 322 lb (146.1 kg)              We Performed the Following     Albumin Random Urine Quantitative with Creat Ratio     CBC with platelets differential     Comprehensive metabolic panel     Hemoglobin A1c     Lipid Profile     UA with Microscopic        Primary Care Provider Office Phone # Fax #    Jonny Manzanares [phone] [address] [facility] [address]        Equal Access to Services     KERLINE MUNOZ AH: Hadii aad ku hadasho Soomaali, waaxda luqadaha, qaybta kaalmada adeegyada, waxay chel hayjyothi jaquez . So [facility] [phone].    ATENCIÓN: Si habla español, tiene a ramírez disposición servicios gratuitos de asistencia lingüística. Llame al [phone].    We comply with applicable federal civil rights laws and [place] laws. We do not discriminate on the basis of race, color, national origin, age, disability, sex, sexual orientation, or gender identity.            Thank you!     Thank you for choosing  [facility]  for your care. Our goal is always to provide you with excellent care. Hearing back from our patients is one way we can continue to improve our services. Please take a few minutes to complete the written survey that you may receive in the mail after your visit with us. Thank you!             Your Updated Medication List - Protect others around you: Learn how to safely use, store and throw away your medicines at www.disposemymeds.org.          This list is accurate as of: 1/17/18  1:22 PM.  Always use your most recent med list.                   Brand Name Dispense Instructions for use Diagnosis    ADVIL PO      Take by mouth as needed for moderate pain        albuterol 108 (90 BASE) MCG/ACT Inhaler    PROAIR HFA/PROVENTIL HFA/VENTOLIN HFA    1 Inhaler    Inhale 2 puffs into the lungs every 6 hours as needed for shortness of breath / dyspnea or wheezing    Acute bronchitis, unspecified organism       blood glucose monitoring lancets     100 each    Use to test blood sugar 1 times daily or as directed.  Ok to substitute alternative if insurance prefers.    Type 2 diabetes mellitus without complication, without long-term current use of insulin (H)       blood glucose monitoring test strip    ONETOUCH VERIO IQ    100 each    Use to test blood sugars 1 times daily or as directed.    Type 2 diabetes mellitus without complication, without long-term current use of insulin (H)       fluticasone 50 MCG/ACT spray    FLONASE    1 Bottle    Spray 1-2 sprays into both nostrils daily    Allergic rhinitis due to animal hair and dander, unspecified rhinitis seasonality       hydrochlorothiazide 25 MG tablet    HYDRODIURIL    90 tablet    Take 1 tablet (25 mg) by mouth daily    Essential hypertension       lisinopril 10 MG tablet    PRINIVIL/ZESTRIL    270 tablet    TAKE 3 TABLETS (30MG) BY MOUTH DAILY    Essential hypertension       metFORMIN 500 MG tablet    GLUCOPHAGE    60 tablet    TAKE 1  TABLET BY MOUTH ONCE DAILY FOR 7 DAYS,THEN INCREASE TO 1 TAB BY MOUTH TWICE DAILY THEREAFTER    Type 2 diabetes mellitus without complication, without long-term current use of insulin (H)

## 2018-02-12 DIAGNOSIS — E11.9 TYPE 2 DIABETES MELLITUS WITHOUT COMPLICATION, WITHOUT LONG-TERM CURRENT USE OF INSULIN (H): ICD-10-CM

## 2018-02-13 NOTE — TELEPHONE ENCOUNTER
"Requested Prescriptions   Pending Prescriptions Disp Refills     metFORMIN (GLUCOPHAGE) 500 MG tablet [Pharmacy Med Name: METFORMIN  MG TABLET] 60 tablet 0      Last Written Prescription Date:  1/8/18  Last Fill Quantity: 60,  # refills: 0   Last office visit: 1/17/2018 with prescribing provider:  Dr Manzanares   Future Office Visit:   Next 5 appointments (look out 90 days)     Feb 19, 2018  9:15 AM CST   SHORT with Jonny Manzanares MD   [facility]    [address] [place] [id number]   [phone]                  Sig: TAKE 1 TABLET BY MOUTH ONCE DAILY FOR 7 DAYS,THEN INCREASE TO 1 TAB BY MOUTH TWICE DAILY THEREAFTER    Biguanide Agents Passed    2/12/2018 11:27 AM       Passed - Blood pressure less than 140/90 in past 6 months    BP Readings from Last 3 Encounters:   01/17/18 136/88   04/14/17 136/82   04/12/17 132/86                Passed - Patient has documented LDL within the past 12 mos.    Recent Labs   Lab Test  01/17/18 0918   LDL  128*            Passed - Patient has had a Microalbumin in the past 12 mos.    Recent Labs   Lab Test  01/17/18 0917   MICROL  15   UMALCR  7.75            Passed - Patient is age 10 or older       Passed - Patient has documented A1c within the specified period of time.    Recent Labs   Lab Test  01/17/18 0918   A1C  5.8            Passed - Patient's CR is NOT>1.4 OR Patient's EGFR is NOT<45 within past 12 mos.    Recent Labs   Lab Test  01/17/18 0918   GFRESTIMATED  >90   GFRESTBLACK  >90       Recent Labs   Lab Test  01/17/18 0918   CR  0.76            Passed - Patient does NOT have a diagnosis of CHF.       Passed - Recent (6 mos) or future visit with authorizing provider's specialty    Patient had office visit in the last 6 months or has a visit in the next 30 days with authorizing provider.  See \"Patient Info\" tab in inbasket, or \"Choose Columns\" in " Meds & Orders section of the refill encounter.

## 2018-06-16 DIAGNOSIS — E11.9 TYPE 2 DIABETES MELLITUS WITHOUT COMPLICATION, WITHOUT LONG-TERM CURRENT USE OF INSULIN (H): ICD-10-CM

## 2018-06-18 NOTE — TELEPHONE ENCOUNTER
"Requested Prescriptions   Pending Prescriptions Disp Refills     metFORMIN (GLUCOPHAGE) 500 MG tablet [Pharmacy Med Name: METFORMIN  MG TABLET]  Last Written Prescription Date:  5/15/18  Last Fill Quantity: 60,  # refills: 0   Last office visit: 1/17/2018 with prescribing provider:  Leighton   Future Office Visit:     60 tablet 0     Sig: DUE FOR OFFICE VISIT *TAKE 1 TABLET (500 MG) BY MOUTH 2 TIMES DAILY (WITH MEALS)    Biguanide Agents Passed    6/16/2018  1:13 PM       Passed - Blood pressure less than 140/90 in past 6 months    BP Readings from Last 3 Encounters:   01/17/18 136/88   04/14/17 136/82   04/12/17 132/86                Passed - Patient has documented LDL within the past 12 mos.    Recent Labs   Lab Test  01/17/18 0918   LDL  128*            Passed - Patient has had a Microalbumin in the past 12 mos.    Recent Labs   Lab Test  01/17/18 0917   MICROL  15   UMALCR  7.75            Passed - Patient is age 10 or older       Passed - Patient has documented A1c within the specified period of time.    If HgbA1C is 8 or greater, it needs to be on file within the past 3 months.  If less than 8, must be on file within the past 6 months.     Recent Labs   Lab Test  01/17/18 0918   A1C  5.8            Passed - Patient's CR is NOT>1.4 OR Patient's EGFR is NOT<45 within past 12 mos.    Recent Labs   Lab Test  01/17/18 0918   GFRESTIMATED  >90   GFRESTBLACK  >90       Recent Labs   Lab Test  01/17/18 0918   CR  0.76            Passed - Patient does NOT have a diagnosis of CHF.       Passed - Recent (6 mo) or future (30 days) visit within the authorizing provider's specialty    Patient had office visit in the last 6 months or has a visit in the next 30 days with authorizing provider or within the authorizing provider's specialty.  See \"Patient Info\" tab in inbasket, or \"Choose Columns\" in Meds & Orders section of the refill encounter.              "

## 2018-07-31 ENCOUNTER — APPOINTMENT (OUTPATIENT)
Dept: GENERAL RADIOLOGY | Facility: CLINIC | Age: 38
End: 2018-07-31
Attending: EMERGENCY MEDICINE
Payer: COMMERCIAL

## 2018-07-31 ENCOUNTER — HOSPITAL ENCOUNTER (EMERGENCY)
Facility: CLINIC | Age: 38
Discharge: HOME | End: 2018-07-31
Attending: EMERGENCY MEDICINE | Admitting: EMERGENCY MEDICINE
Payer: COMMERCIAL

## 2018-07-31 VITALS
RESPIRATION RATE: 15 BRPM | DIASTOLIC BLOOD PRESSURE: 95 MMHG | HEIGHT: 72 IN | SYSTOLIC BLOOD PRESSURE: 152 MMHG | TEMPERATURE: 98.2 F | BODY MASS INDEX: 41.99 KG/M2 | WEIGHT: 310 LBS | HEART RATE: 75 BPM | OXYGEN SATURATION: 100 %

## 2018-07-31 DIAGNOSIS — R07.89 ATYPICAL CHEST PAIN: ICD-10-CM

## 2018-07-31 LAB
ALBUMIN SERPL-MCNC: 3.8 G/DL (ref 3.4–5)
ALP SERPL-CCNC: 49 U/L (ref 40–150)
ALT SERPL W P-5'-P-CCNC: 43 U/L (ref 0–70)
ANION GAP SERPL CALCULATED.3IONS-SCNC: 6 MMOL/L (ref 3–14)
AST SERPL W P-5'-P-CCNC: 29 U/L (ref 0–45)
BASOPHILS # BLD AUTO: 0 10E9/L (ref 0–0.2)
BASOPHILS NFR BLD AUTO: 0.1 %
BILIRUB SERPL-MCNC: 0.3 MG/DL (ref 0.2–1.3)
BUN SERPL-MCNC: 21 MG/DL (ref 7–30)
CALCIUM SERPL-MCNC: 8.4 MG/DL (ref 8.5–10.1)
CHLORIDE SERPL-SCNC: 102 MMOL/L (ref 94–109)
CO2 SERPL-SCNC: 28 MMOL/L (ref 20–32)
CREAT SERPL-MCNC: 0.79 MG/DL (ref 0.66–1.25)
D DIMER PPP FEU-MCNC: <0.3 UG/ML FEU (ref 0–0.5)
DIFFERENTIAL METHOD BLD: NORMAL
EOSINOPHIL # BLD AUTO: 0.4 10E9/L (ref 0–0.7)
EOSINOPHIL NFR BLD AUTO: 4.1 %
ERYTHROCYTE [DISTWIDTH] IN BLOOD BY AUTOMATED COUNT: 13 % (ref 10–15)
GFR SERPL CREATININE-BSD FRML MDRD: >90 ML/MIN/1.7M2
GLUCOSE SERPL-MCNC: 120 MG/DL (ref 70–99)
HCT VFR BLD AUTO: 44 % (ref 40–53)
HGB BLD-MCNC: 15.4 G/DL (ref 13.3–17.7)
IMM GRANULOCYTES # BLD: 0 10E9/L (ref 0–0.4)
IMM GRANULOCYTES NFR BLD: 0.2 %
INTERPRETATION ECG - MUSE: NORMAL
LYMPHOCYTES # BLD AUTO: 3 10E9/L (ref 0.8–5.3)
LYMPHOCYTES NFR BLD AUTO: 33.7 %
MCH RBC QN AUTO: 30.3 PG (ref 26.5–33)
MCHC RBC AUTO-ENTMCNC: 35 G/DL (ref 31.5–36.5)
MCV RBC AUTO: 87 FL (ref 78–100)
MONOCYTES # BLD AUTO: 0.8 10E9/L (ref 0–1.3)
MONOCYTES NFR BLD AUTO: 8.7 %
NEUTROPHILS # BLD AUTO: 4.8 10E9/L (ref 1.6–8.3)
NEUTROPHILS NFR BLD AUTO: 53.2 %
PLATELET # BLD AUTO: 152 10E9/L (ref 150–450)
POTASSIUM SERPL-SCNC: 3.8 MMOL/L (ref 3.4–5.3)
PROT SERPL-MCNC: 7.6 G/DL (ref 6.8–8.8)
RBC # BLD AUTO: 5.08 10E12/L (ref 4.4–5.9)
SODIUM SERPL-SCNC: 136 MMOL/L (ref 133–144)
TROPONIN I SERPL-MCNC: <0.015 UG/L (ref 0–0.04)
WBC # BLD AUTO: 9 10E9/L (ref 4–11)

## 2018-07-31 PROCEDURE — 85025 COMPLETE CBC W/AUTO DIFF WBC: CPT | Performed by: EMERGENCY MEDICINE

## 2018-07-31 PROCEDURE — 80053 COMPREHEN METABOLIC PANEL: CPT | Performed by: EMERGENCY MEDICINE

## 2018-07-31 PROCEDURE — 85379 FIBRIN DEGRADATION QUANT: CPT | Performed by: EMERGENCY MEDICINE

## 2018-07-31 PROCEDURE — 71046 X-RAY EXAM CHEST 2 VIEWS: CPT

## 2018-07-31 PROCEDURE — 99285 EMERGENCY DEPT VISIT HI MDM: CPT | Mod: 25

## 2018-07-31 PROCEDURE — 84484 ASSAY OF TROPONIN QUANT: CPT | Performed by: EMERGENCY MEDICINE

## 2018-07-31 PROCEDURE — 93005 ELECTROCARDIOGRAM TRACING: CPT

## 2018-07-31 ASSESSMENT — ENCOUNTER SYMPTOMS
FEVER: 0
VOMITING: 0
ABDOMINAL PAIN: 0
NERVOUS/ANXIOUS: 1
PALPITATIONS: 0
SHORTNESS OF BREATH: 0
COUGH: 0
NAUSEA: 0
DIARRHEA: 0

## 2018-07-31 NOTE — ED NOTES
Bed: ED06  Expected date:   Expected time:   Means of arrival:   Comments:  Housekeeping clean called at 0030

## 2018-07-31 NOTE — ED PROVIDER NOTES
"  History     Chief Complaint:  Chest Pain     HPI   Al Smith is a 38 year old male with a history of hypertension, hyperlipidemia, and type II diabetes mellitus who presents accompanied by his wife for evaluation of chest pain. Tonight around 1900 shortly after eating dinner, the patient was resting when he started to develop pain and tightness in his left upper chest that lasted for about 10 minutes. The patient characterizes this pain as feeling like \"a tired muscle,\" and he states that the painful area was about the size of a silver dollar. Later when the patient was trying to fall asleep he started to develop mild recurrent chest discomfort. He started to feel anxious regarding this chest pain and felt that his left arm was falling asleep. Due to this chest pain, the patient came into the ED for evaluation. Currently in the ED, the patient rates his pain at a severity of 2/10. He has never had similar pain. He has not traveled recently and has no history of blood clots. Otherwise, he has not had any recent fever, cough, shortness of breath, palpitations, leg swelling, abdominal pain, nausea, vomiting, or diarrhea. Notably, the patient recently moved and has been under more stress recently.     Cardiac Risk Factors:  CAD:    Negative  Hypertension:   Positive  Hyperlipidemia:  Positive   Diabetes:   Positive   Tobacco use:   Negative   Gender:   Male  Age:    38  Familial Hx of CAD:  Negative      PE/DVT Risk Factors:   Hx of PE/DVT:   Negative   Hx of clotting disorder:  Negative   Hx of cancer:    Negative   Tobacco use:    Negative   Hormone therapy:   Negative   Prolonged immobilization:  Negative  Recent surgery:   Negative  Recent travel:    Negative  Familial Hx of PE/DVT:  Negative      Allergies:  NKDA      Medications:    albuterol (PROAIR HFA/PROVENTIL HFA/VENTOLIN HFA) 108 (90 BASE) MCG/ACT Inhaler  fluticasone (FLONASE) 50 MCG/ACT spray  hydrochlorothiazide (HYDRODIURIL) 25 MG " tablet  Ibuprofen (ADVIL PO)  lisinopril (PRINIVIL/ZESTRIL) 10 MG tablet  metFORMIN (GLUCOPHAGE) 500 MG tablet    Past Medical History:    Diabetes mellitus, type II   Hypertension   Hyperlipemia   Obesity     Past Surgical History:    Orthopedic surgery     Family History:    Diabetes - Father   Arthritis - Mother     Social History:  Tobacco use:    Never smoker  Alcohol use:    Positive, socially   Marital status:       Accompanied to ED by:  Wife      Review of Systems   Constitutional: Negative for fever.   Respiratory: Negative for cough and shortness of breath.    Cardiovascular: Positive for chest pain. Negative for palpitations and leg swelling.   Gastrointestinal: Negative for abdominal pain, diarrhea, nausea and vomiting.   Psychiatric/Behavioral: The patient is nervous/anxious.    All other systems reviewed and are negative.    Physical Exam   First Vitals:  BP: (!) 152/95  Pulse: 75  Temp: 98.2  F (36.8  C)  Resp: 15  Height: 182.9 cm (6')  Weight: 140.6 kg (310 lb)  SpO2: 100 %    Physical Exam  GENERAL: well developed, pleasant  HEAD: atraumatic  EYES: pupils reactive, extraocular muscles intact, conjunctivae normal  ENT:  mucus membranes moist  NECK:  trachea midline, normal range of motion  RESPIRATORY: no tachypnea, breath sounds clear to auscultation   CVS: normal S1/S2, no murmurs, intact distal pulses  ABDOMEN: soft, nontender, nondistention  MUSCULOSKELETAL: no deformities  SKIN: warm and dry, no acute rashes or ulceration  NEURO: GCS 15, cranial nerves intact, alert and oriented x3  PSYCH:  Mood/affect normal      Emergency Department Course   ECG (0:37:13):  Indication: Screening for cardiovascular disease.   Rate 75 bpm. MS interval 158 ms. QRS duration 112 ms. QT/QTc 386/431 ms. P-R-T axes 37 27 49.   Interpretation: Normal sinus rhythm, Normal ECG  Agree with computer interpretation. Yes   Interpreted at 0045 by Dr. Slaughter.      Imaging:  Radiographic findings were communicated with  the patient and family who voiced understanding of the findings.    XR Chest:  IMPRESSION: No acute abnormality.  Preliminary report per radiology.     Laboratory:  CBC: WNL (WBC 9.0, HGB 15.4, )  CMP: Glucose 120 high, Calcium 8.4 low, o/w WNL (Creatinine 0.79)  Troponin I 0055: <0.015    D dimer quantitative: <0.3     Emergency Department Course:  Nursing notes and vitals reviewed.  0127: I performed an exam of the patient as documented above.     0213: I updated and reassessed the patient.     Findings and plan explained to the Patient and spouse. Patient discharged home with instructions regarding supportive care, medications, and reasons to return. The importance of close follow-up was reviewed.     Impression & Plan      Medical Decision Making:  Al Smith is a 38 year old male who presents with chest pain and notes that it is about a silver-dollar size in nature and lasted less than 10 minutes and occurred while at rest. The patient has a couple of risk factors but has never had pain like this before. Workup including D dimer, chest X-ray, troponin, and EKG are all normal. No findings for pericarditis. Symptoms have subsided. This did occur after eating and certainly could be GERD versus musculoskeletal. My pretest probability is low and I do not feel that he needs a stress test at this time. I did discuss with him the limitations of testing and if symptoms would return or worsen for a longer period of time he can return or follow up with his primary potentially to get a stress test if needed.     Diagnosis:    ICD-10-CM   1. Atypical chest pain R07.89       Disposition:  Discharged to home.       Abdi CHEN, am serving as a scribe at 1:27 AM on 7/31/2018 to document services personally performed by Dr. Slaughter, based on my observations and the provider's statements to me.     EMERGENCY DEPARTMENT       Mega Slaughter MD  08/03/18 1026

## 2018-07-31 NOTE — ED AVS SNAPSHOT
Emergency Department    [address] [facility] [place] [address]    Phone:  [phone]    Fax:  [phone]                                       Al Smith   MRN: [id number]    Department:   Emergency Department   Date of Visit:  7/31/2018           Patient Information     Date Of Birth          1980        Your diagnoses for this visit were:     Atypical chest pain        You were seen by Mega Slaughter MD.      Follow-up Information     Follow up with Jonny Manzanares MD.    Specialty:  Family Practice    Contact information:    [address]  [facility] [address]  [phone]          Discharge Instructions       Discharge Instructions  Chest Pain    You have been seen today for chest pain or discomfort.  At this time, your doctor has found no signs that your chest pain is due to a serious or life-threatening condition, (or you have declined more testing and/or admission to the hospital). However, sometimes there is a serious problem that does not show up right away. Your evaluation today may not be complete and you may need further testing and evaluation.     You need to follow-up with your regular doctor within 3 days.    Return to the Emergency Department if:    Your chest pain changes, gets worse, starts to happen more often, or comes with less activity.    You are short of breath.    You get very weak or tired.    You pass out or faint.    You have any new symptoms, like fever, cough, numb legs, or you cough up blood.    You have anything else that worries you.    Until you follow-up with your regular doctor please do the following:    Take one aspirin daily unless you have an allergy or are told not to by your doctor.    If a stress test appointment has been made, go to the appointment.    If you have questions, contact your regular doctor.    If your doctor today has told you to follow-up with your regular doctor, it is very important that you make an appointment  with your clinic and go to the appointment.  If you do not follow-up with your primary doctor, it may result in missing an important development which could result in permanent injury or disability and/or lasting pain.  If there is any problem keeping your appointment, call your doctor or return to the Emergency Department.    If you were given a prescription for medicine here today, be sure to read all of the information (including the package insert) that comes with your prescription.  This will include important information about the medicine, its side effects, and any warnings that you need to know about.  The pharmacist who fills the prescription can provide more information and answer questions you may have about the medicine.  If you have questions or concerns that the pharmacist cannot address, please call or return to the Emergency Department.     Opioid Medication Information    Pain medications are among the most commonly prescribed medicines, so we are including this information for all our patients. If you did not receive pain medication or get a prescription for pain medicine, you can ignore it.     You may have been given a prescription for an opioid (narcotic) pain medicine and/or have received a pain medicine while here in the Emergency Department. These medicines can make you drowsy or impaired. You must not drive, operate dangerous equipment, or engage in any other dangerous activities while taking these medications. If you drive while taking these medications, you could be arrested for DUI, or driving under the influence. Do not drink any alcohol while you are taking these medications.     Opioid pain medications can cause addiction. If you have a history of chemical dependency of any type, you are at a higher risk of becoming addicted to pain medications.  Only take these prescribed medications to treat your pain when all other options have been tried. Take it for as short a time and as few  doses as possible. Store your pain pills in a secure place, as they are frequently stolen and provide a dangerous opportunity for children or visitors in your house to start abusing these powerful medications. We will not replace any lost or stolen medicine.  As soon as your pain is better, you should flush all your remaining medication.     Many prescription pain medications contain Tylenol  (acetaminophen), including Vicodin , Tylenol #3 , Norco , Lortab , and Percocet .  You should not take any extra pills of Tylenol  if you are using these prescription medications or you can get very sick.  Do not ever take more than 3000 mg of acetaminophen in any 24 hour period.    All opioids tend to cause constipation. Drink plenty of water and eat foods that have a lot of fiber, such as fruits, vegetables, prune juice, apple juice and high fiber cereal.  Take a laxative if you don t move your bowels at least every other day. Miralax , Milk of Magnesia, Colace , or Senna  can be used to keep you regular.      Remember that you can always come back to the Emergency Department if you are not able to see your regular doctor in the amount of time listed above, if you get any new symptoms, or if there is anything that worries you.          24 Hour Appointment Hotline       To make an appointment at any [facility] call [phone] ([phone]). If you don't have a family doctor or clinic, we will help you find one. [facility] clinics are conveniently located to serve the needs of you and your family.             Review of your medicines      Our records show that you are taking the medicines listed below. If these are incorrect, please call your family doctor or clinic.        Dose / Directions Last dose taken    ADVIL PO        Take by mouth as needed for moderate pain   Refills:  0        albuterol 108 (90 Base) MCG/ACT Inhaler   Commonly known as:  PROAIR HFA/PROVENTIL HFA/VENTOLIN HFA   Dose:  2 puff   Quantity:  1  Inhaler        Inhale 2 puffs into the lungs every 6 hours as needed for shortness of breath / dyspnea or wheezing   Refills:  0        blood glucose monitoring lancets   Quantity:  100 each        Use to test blood sugar 1 times daily or as directed.  Ok to substitute alternative if insurance prefers.   Refills:  1        blood glucose monitoring test strip   Commonly known as:  ONETOUCH VERIO IQ   Quantity:  100 each        Use to test blood sugars 1 times daily or as directed.   Refills:  1        fluticasone 50 MCG/ACT spray   Commonly known as:  FLONASE   Dose:  1-2 spray   Quantity:  1 Bottle        Spray 1-2 sprays into both nostrils daily   Refills:  11        hydrochlorothiazide 25 MG tablet   Commonly known as:  HYDRODIURIL   Quantity:  90 tablet        TAKE 1 TABLET (25 MG) BY MOUTH DAILY   Refills:  2        lisinopril 10 MG tablet   Commonly known as:  PRINIVIL/ZESTRIL   Quantity:  270 tablet        TAKE 3 TABLETS (30MG) BY MOUTH DAILY   Refills:  2        metFORMIN 500 MG tablet   Commonly known as:  GLUCOPHAGE   Quantity:  60 tablet        DUE FOR OFFICE VISIT *TAKE 1 TABLET (500 MG) BY MOUTH 2 TIMES DAILY (WITH MEALS)   Refills:  0                Procedures and tests performed during your visit     CBC with platelets differential    Comprehensive metabolic panel    D dimer quantitative    EKG 12 lead    Troponin I    XR Chest 2 Views      Orders Needing Specimen Collection     None      Pending Results     Date and Time Order Name Status Description    7/31/2018 0131 XR Chest 2 Views Preliminary             Pending Culture Results     No orders found from 7/29/2018 to 8/1/2018.            Pending Results Instructions     If you had any lab results that were not finalized at the time of your Discharge, you can call the ED Lab Result RN at [phone]. You will be contacted by this team for any positive Lab results or changes in treatment. The nurses are available 7 days a week from 10A to 6:30P.  You  can leave a message 24 hours per day and they will return your call.        Test Results From Your Hospital Stay        7/31/2018  1:06 AM      Component Results     Component Value Ref Range & Units Status    WBC 9.0 4.0 - 11.0 10e9/L Final    RBC Count 5.08 4.4 - 5.9 10e12/L Final    Hemoglobin 15.4 13.3 - 17.7 g/dL Final    Hematocrit 44.0 40.0 - 53.0 % Final    MCV 87 78 - 100 fl Final    MCH 30.3 26.5 - 33.0 pg Final    MCHC 35.0 31.5 - 36.5 g/dL Final    RDW 13.0 10.0 - 15.0 % Final    Platelet Count 152 150 - 450 10e9/L Final    Diff Method Automated Method  Final    % Neutrophils 53.2 % Final    % Lymphocytes 33.7 % Final    % Monocytes 8.7 % Final    % Eosinophils 4.1 % Final    % Basophils 0.1 % Final    % Immature Granulocytes 0.2 % Final    Absolute Neutrophil 4.8 1.6 - 8.3 10e9/L Final    Absolute Lymphocytes 3.0 0.8 - 5.3 10e9/L Final    Absolute Monocytes 0.8 0.0 - 1.3 10e9/L Final    Absolute Eosinophils 0.4 0.0 - 0.7 10e9/L Final    Absolute Basophils 0.0 0.0 - 0.2 10e9/L Final    Abs Immature Granulocytes 0.0 0 - 0.4 10e9/L Final         7/31/2018  1:26 AM      Component Results     Component Value Ref Range & Units Status    Sodium 136 133 - 144 mmol/L Final    Potassium 3.8 3.4 - 5.3 mmol/L Final    Specimen slightly hemolyzed, potassium may be falsely elevated    Chloride 102 94 - 109 mmol/L Final    Carbon Dioxide 28 20 - 32 mmol/L Final    Anion Gap 6 3 - 14 mmol/L Final    Glucose 120 (H) 70 - 99 mg/dL Final    Urea Nitrogen 21 7 - 30 mg/dL Final    Creatinine 0.79 0.66 - 1.25 mg/dL Final    GFR Estimate >90 >60 mL/min/1.7m2 Final    Non  GFR Calc    GFR Estimate If Black >90 >60 mL/min/1.7m2 Final    African American GFR Calc    Calcium 8.4 (L) 8.5 - 10.1 mg/dL Final    Bilirubin Total 0.3 0.2 - 1.3 mg/dL Final    Albumin 3.8 3.4 - 5.0 g/dL Final    Protein Total 7.6 6.8 - 8.8 g/dL Final    Alkaline Phosphatase 49 40 - 150 U/L Final    ALT 43 0 - 70 U/L Final    AST 29 0 - 45  U/L Final    Specimen is hemolyzed which can falsely elevate AST. Analysis of a   non-hemolyzed specimen may result in a lower value.           7/31/2018  1:26 AM      Component Results     Component Value Ref Range & Units Status    Troponin I ES <0.015 0.000 - 0.045 ug/L Final    The 99th percentile for upper reference range is 0.045 ug/L.  Troponin values   in the range of 0.045 - 0.120 ug/L may be associated with risks of adverse   clinical events.           7/31/2018  2:10 AM      Narrative     XR CHEST 2 VW  7/31/2018 2:00 AM     HISTORY: Chest pain.    COMPARISON: None.    FINDINGS: The heart size is normal. The lungs are clear. No  pneumothorax or pleural effusion.        Impression     IMPRESSION: No acute abnormality.         7/31/2018  1:53 AM      Component Results     Component Value Ref Range & Units Status    D Dimer <0.3 0.0 - 0.50 ug/ml FEU Final    This D-dimer assay is intended for use in conjunction with a clinical pretest   probability assessment model to exclude pulmonary embolism (PE) and deep   venous thrombosis (DVT) in outpatients suspected of PE or DVT. The cut-off   value is 0.5 ug/mL FEU.                  Clinical Quality Measure: Blood Pressure Screening     Your blood pressure was checked while you were in the emergency department today. The last reading we obtained was  BP: (!) 152/95 . Please read the guidelines below about what these numbers mean and what you should do about them.  If your systolic blood pressure (the top number) is less than 120 and your diastolic blood pressure (the bottom number) is less than 80, then your blood pressure is normal. There is nothing more that you need to do about it.  If your systolic blood pressure (the top number) is 120-139 or your diastolic blood pressure (the bottom number) is 80-89, your blood pressure may be higher than it should be. You should have your blood pressure rechecked within a year by a primary care provider.  If your systolic  blood pressure (the top number) is 140 or greater or your diastolic blood pressure (the bottom number) is 90 or greater, you may have high blood pressure. High blood pressure is treatable, but if left untreated over time it can put you at risk for heart attack, stroke, or kidney failure. You should have your blood pressure rechecked by a primary care provider within the next 4 weeks.  If your provider in the emergency department today gave you specific instructions to follow-up with your doctor or provider even sooner than that, you should follow that instruction and not wait for up to 4 weeks for your follow-up visit.        Thank you for choosing [facility]       Thank you for choosing [facility] for your care. Our goal is always to provide you with excellent care. Hearing back from our patients is one way we can continue to improve our services. Please take a few minutes to complete the written survey that you may receive in the mail after you visit with us. Thank you!        [employer] Information     [facility] gives you secure access to your electronic health record. If you see a primary care provider, you can also send messages to your care team and make appointments. If you have questions, please call your primary care clinic.  If you do not have a primary care provider, please call [phone] and they will assist you.        Care EveryWhere ID     This is your Care EveryWhere ID. This could be used by other organizations to access your [facility] medical records  [id number]        Equal Access to Services     KERLINE MUNOZ : Hadii fabiola Lewis, waaxda luqadaha, qaybta kaalmada jasmin, duane myers. So [facility] [phone].    ATENCIÓN: Si habla español, tiene a ramírez disposición servicios gratuitos de asistencia lingüística. Llame al [phone].    We comply with applicable federal civil rights laws and [place] laws. We do not discriminate on the basis of race, color,  national origin, age, disability, sex, sexual orientation, or gender identity.            After Visit Summary       This is your record. Keep this with you and show to your community pharmacist(s) and doctor(s) at your next visit.

## 2018-07-31 NOTE — ED AVS SNAPSHOT
Emergency Department    [address] [place] [address]    Phone:  [phone]    Fax:  [phone]                                       Al Smith   MRN: [id number]    Department:   Emergency Department   Date of Visit:  7/31/2018           After Visit Summary Signature Page     I have received my discharge instructions, and my questions have been answered. I have discussed any challenges I see with this plan with the nurse or doctor.    ..........................................................................................................................................  Patient/Patient Representative Signature      ..........................................................................................................................................  Patient Representative Print Name and Relationship to Patient    ..................................................               ................................................  Date                                            Time    ..........................................................................................................................................  Reviewed by Signature/Title    ...................................................              ..............................................  Date                                                            Time

## 2018-08-02 ENCOUNTER — OFFICE VISIT (OUTPATIENT)
Dept: INTERNAL MEDICINE | Facility: CLINIC | Age: 38
End: 2018-08-02
Payer: COMMERCIAL

## 2018-08-02 VITALS
BODY MASS INDEX: 42.41 KG/M2 | RESPIRATION RATE: 16 BRPM | TEMPERATURE: 98.1 F | WEIGHT: 312.7 LBS | OXYGEN SATURATION: 97 % | HEART RATE: 95 BPM | SYSTOLIC BLOOD PRESSURE: 135 MMHG | DIASTOLIC BLOOD PRESSURE: 80 MMHG

## 2018-08-02 DIAGNOSIS — E66.01 MORBID OBESITY (H): ICD-10-CM

## 2018-08-02 DIAGNOSIS — E11.9 TYPE 2 DIABETES MELLITUS WITHOUT COMPLICATION, WITHOUT LONG-TERM CURRENT USE OF INSULIN (H): Primary | ICD-10-CM

## 2018-08-02 DIAGNOSIS — R07.89 ATYPICAL CHEST PAIN: ICD-10-CM

## 2018-08-02 PROCEDURE — 99214 OFFICE O/P EST MOD 30 MIN: CPT | Performed by: INTERNAL MEDICINE

## 2018-08-02 NOTE — MR AVS SNAPSHOT
After Visit Summary   8/2/2018    Al Smith    MRN: [id number]           Patient Information     Date Of Birth          1980        Visit Information        Provider Department      8/2/2018 2:00 PM Horace Pina MD [facility]        Today's Diagnoses     Type 2 diabetes mellitus without complication, without long-term current use of insulin (H)    -  1    Atypical chest pain        Morbid obesity (H)           Follow-ups after your visit        Future tests that were ordered for you today     Open Future Orders        Priority Expected Expires Ordered    Exercise Stress Echocardiogram Routine  8/2/2019 8/2/2018    Lipid panel reflex to direct LDL Fasting Routine  10/31/2018 8/2/2018    Comprehensive metabolic panel Routine  10/31/2018 8/2/2018    Hemoglobin A1c Routine  10/31/2018 8/2/2018    TSH with free T4 reflex Routine  10/31/2018 8/2/2018    Albumin Random Urine Quantitative with Creat Ratio Routine  10/31/2018 8/2/2018            Who to contact     If you have questions or need follow up information about today's clinic visit or your schedule please contact [facility] directly at [phone].  Normal or non-critical lab and imaging results will be communicated to you by Mismihart, letter or phone within 4 business days after the clinic has received the results. If you do not hear from us within 7 days, please contact the clinic through M[employer]art or phone. If you have a critical or abnormal lab result, we will notify you by phone as soon as possible.  Submit refill requests through [employer] or call your pharmacy and they will forward the refill request to us. Please allow 3 business days for your refill to be completed.          Additional Information About Your Visit        MyChart Information     [employer] gives you secure access to your electronic health record. If you see a primary care provider, you can also send messages to  your care team and make appointments. If you have questions, please call your primary care clinic.  If you do not have a primary care provider, please call [phone] and they will assist you.        Care EveryWhere ID     This is your Care EveryWhere ID. This could be used by other organizations to access your [place] medical records  [id number]        Your Vitals Were     Pulse Temperature Respirations Pulse Oximetry BMI (Body Mass Index)       95 98.1  F (36.7  C) (Oral) 16 97% 42.41 kg/m2        Blood Pressure from Last 3 Encounters:   08/02/18 135/80   07/31/18 (!) 152/95   01/17/18 136/88    Weight from Last 3 Encounters:   08/02/18 312 lb 11.2 oz (141.8 kg)   07/31/18 310 lb (140.6 kg)   01/17/18 (!) 307 lb (139.3 kg)               Primary Care Provider Office Phone # Fax #    Jonny Manzanares [phone]       [address] [facility] [address]        Equal Access to Services     [facility] Center[facility] : Hadii aad ku hadasho Soomaali, waaxda luqadaha, qaybta kaalmada adeegyada, duane jaquez . So [facility] [phone].    ATENCIÓN: Si habla español, tiene a ramírez disposición servicios gratuitos de asistencia lingüística. Larry al [phone].    We comply with applicable federal civil rights laws and [place] laws. We do not discriminate on the basis of race, color, national origin, age, disability, sex, sexual orientation, or gender identity.            Thank you!     Thank you for choosing [facility]  for your care. Our goal is always to provide you with excellent care. Hearing back from our patients is one way we can continue to improve our services. Please take a few minutes to complete the written survey that you may receive in the mail after your visit with us. Thank you!             Your Updated Medication List - Protect others around you: Learn how to safely use, store and throw away your medicines at www.disposemymeds.org.           This list is accurate as of 8/2/18  2:48 PM.  Always use your most recent med list.                   Brand Name Dispense Instructions for use Diagnosis    ADVIL PO      Take by mouth as needed for moderate pain        albuterol 108 (90 Base) MCG/ACT Inhaler    PROAIR HFA/PROVENTIL HFA/VENTOLIN HFA    1 Inhaler    Inhale 2 puffs into the lungs every 6 hours as needed for shortness of breath / dyspnea or wheezing    Acute bronchitis, unspecified organism       blood glucose monitoring lancets     100 each    Use to test blood sugar 1 times daily or as directed.  Ok to substitute alternative if insurance prefers.    Type 2 diabetes mellitus without complication, without long-term current use of insulin (H)       blood glucose monitoring test strip    ONETOUCH VERIO IQ    100 each    Use to test blood sugars 1 times daily or as directed.    Type 2 diabetes mellitus without complication, without long-term current use of insulin (H)       fluticasone 50 MCG/ACT spray    FLONASE    1 Bottle    Spray 1-2 sprays into both nostrils daily    Allergic rhinitis due to animal hair and dander, unspecified rhinitis seasonality       hydrochlorothiazide 25 MG tablet    HYDRODIURIL    90 tablet    TAKE 1 TABLET (25 MG) BY MOUTH DAILY    Essential hypertension       lisinopril 10 MG tablet    PRINIVIL/ZESTRIL    270 tablet    TAKE 3 TABLETS (30MG) BY MOUTH DAILY    Essential hypertension       metFORMIN 500 MG tablet    GLUCOPHAGE    60 tablet    DUE FOR OFFICE VISIT *TAKE 1 TABLET (500 MG) BY MOUTH 2 TIMES DAILY (WITH MEALS)    Type 2 diabetes mellitus without complication, without long-term current use of insulin (H)

## 2018-08-02 NOTE — PROGRESS NOTES
"  SUBJECTIVE:   Al Smith is a 38 year old male who presents to clinic today for the following health issues:      ED/UC Followup:    Facility:  [facility]  Date of visit: 07/31/2018  Reason for visit: Chest pain  Current Status: Has felt good. Although that night numbness on and off and pain in shoulder/elbow so, might be something wrong with left shoulder.      Pt would like to make you his PCP and discuss diabetes if not at this visit he will make an appointment.         Problem list and histories reviewed & adjusted, as indicated.  Additional history:     Continues to have intermittent well localized left upper chest pain, worse with subjective deep palpation.  Pain also radiating more to his left shoulder, worse with certain movements.  Has been more physically active with both arms as of late, as he and his wife just completed a move.    Patient has type 2 diabetes, currently treated with metformin monotherapy.  Last A1c earlier this year was at reasonable goal, under 6.  He is morbidly obese, has \"fallen off the wagon\" in the last few months with diet.  Also continues on lisinopril and hydrochlorothiazide for hypertension.    Reviewed and updated as needed this visit by clinical staff       Reviewed and updated as needed this visit by Provider         ROS:  Constitutional, HEENT, cardiovascular, pulmonary, GI, , musculoskeletal, neuro, skin, endocrine and psych systems are negative, except as otherwise noted.    OBJECTIVE:     /80  Pulse 95  Temp 98.1  F (36.7  C) (Oral)  Resp 16  Wt 312 lb 11.2 oz (141.8 kg)  SpO2 97%  BMI 42.41 kg/m2  Body mass index is 42.41 kg/(m^2).  GENERAL: healthy, alert and no distress  NECK: no adenopathy, no asymmetry, masses, or scars and thyroid normal to palpation  RESP: lungs clear to auscultation - no rales, rhonchi or wheezes  CV: regular rate and rhythm, normal S1 S2, no S3 or S4, no murmur, click or rub, no peripheral edema and peripheral pulses " strong  ABDOMEN: soft, nontender, no hepatosplenomegaly, no masses and bowel sounds normal  MS: Left upper chest pain completely reproducible with deep palpation.  Mild subacromial tenderness in left shoulder, but overall range of motion is normal.        ASSESSMENT/PLAN:     1. Type 2 diabetes mellitus without complication, without long-term current use of insulin (H)  Return to clinic in next 2 months, with repeat surveillance labs prior.  - Lipid panel reflex to direct LDL Fasting; Future  - Comprehensive metabolic panel; Future  - Hemoglobin A1c; Future  - TSH with free T4 reflex; Future  - Albumin Random Urine Quantitative with Creat Ratio; Future      2. Atypical chest pain  Likely musculoskeletal chest pain, however given patient's risk factors we will proceed with noninvasive stress test.  - Exercise Stress Echocardiogram; Future        Horace Pina MD  [facility]

## 2018-08-02 NOTE — LETTER
[facility]  [address] [address]  [phone]        March 6, 2019    Al Smith  [address] [address]              Dear Al Smith    This is to remind you that your fasting labs is due.    You may call our office at [phone] to schedule an appointment.    Please disregard this notice if you have already had your labs drawn or made an appointment.        Sincerely,        Horace Pina MD

## 2018-10-18 ENCOUNTER — TRANSFERRED RECORDS (OUTPATIENT)
Dept: HEALTH INFORMATION MANAGEMENT | Facility: CLINIC | Age: 38
End: 2018-10-18

## 2019-03-01 ENCOUNTER — TRANSFERRED RECORDS (OUTPATIENT)
Dept: HEALTH INFORMATION MANAGEMENT | Facility: CLINIC | Age: 39
End: 2019-03-01

## 2019-03-29 DIAGNOSIS — E11.9 TYPE 2 DIABETES MELLITUS WITHOUT COMPLICATION, WITHOUT LONG-TERM CURRENT USE OF INSULIN (H): ICD-10-CM

## 2019-03-29 NOTE — TELEPHONE ENCOUNTER
"METFORMIN  MG TABLET  Last Written Prescription Date:  08/10/2018  Last Fill Quantity: 60,  # refills: 0   Last office visit: 1/17/2018 with prescribing provider:  01/17/2018   Future Office Visit:    Requested Prescriptions   Pending Prescriptions Disp Refills     metFORMIN (GLUCOPHAGE) 500 MG tablet [Pharmacy Med Name: METFORMIN  MG TABLET] 60 tablet 0     Sig: TAKE 1 TABLET (500 MG) BY MOUTH 2 TIMES DAILY (WITH MEALS)    Biguanide Agents Failed - 3/29/2019  4:23 PM       Failed - Blood pressure less than 140/90 in past 6 months    BP Readings from Last 3 Encounters:   08/02/18 135/80   07/31/18 (!) 152/95   01/17/18 136/88                Failed - Patient has documented LDL within the past 12 mos.    Recent Labs   Lab Test 01/17/18 0918   *            Failed - Patient has documented A1c within the specified period of time.    If HgbA1C is 8 or greater, it needs to be on file within the past 3 months.  If less than 8, must be on file within the past 6 months.     Recent Labs   Lab Test 01/17/18 0918   A1C 5.8            Failed - Recent (6 mo) or future (30 days) visit within the authorizing provider's specialty    Patient had office visit in the last 6 months or has a visit in the next 30 days with authorizing provider or within the authorizing provider's specialty.  See \"Patient Info\" tab in inbasket, or \"Choose Columns\" in Meds & Orders section of the refill encounter.           Passed - Patient has had a Microalbumin in the past 15 mos.    Recent Labs   Lab Test 01/17/18 0917   MICROL 15   UMALCR 7.75            Passed - Patient is age 10 or older       Passed - Patient's CR is NOT>1.4 OR Patient's EGFR is NOT<45 within past 12 mos.    Recent Labs   Lab Test 07/31/18  0055   GFRESTIMATED >90   GFRESTBLACK >90       Recent Labs   Lab Test 07/31/18 0055   CR 0.79            Passed - Patient does NOT have a diagnosis of CHF.       Passed - Medication is active on med list          "

## 2019-04-01 NOTE — TELEPHONE ENCOUNTER
Carrie refill given . Pt agreed to contact [facility] clinic for F/U appointment. Notes A1C at [facility] was 6.8.

## 2019-05-16 ENCOUNTER — TELEPHONE (OUTPATIENT)
Dept: INTERNAL MEDICINE | Facility: CLINIC | Age: 39
End: 2019-05-16

## 2019-05-16 DIAGNOSIS — E11.9 TYPE 2 DIABETES MELLITUS WITHOUT COMPLICATION, WITHOUT LONG-TERM CURRENT USE OF INSULIN (H): Primary | ICD-10-CM

## 2019-05-16 NOTE — TELEPHONE ENCOUNTER
Panel Management Review      Patient Active Problem List   Diagnosis     Morbid obesity (H)     Seasonal allergic rhinitis     Mixed hyperlipidemia     Essential hypertension     Other male erectile dysfunction     Type 2 diabetes mellitus without complication, without long-term current use of insulin (H)       Composite cancer screening  Chart review shows that this patient is due/due soon for the following None  Summary:    Patient is due/failing the following:   A1C    Action needed:   Patient needs office visit for Diabetic f/u and lab appointment before seeing PCP.    Type of outreach:    Phone, left message for patient to call back.     Questions for provider review:    None                                                                                                                                    Serina Ruiz

## 2019-05-16 NOTE — TELEPHONE ENCOUNTER
Patient due for DM follow-up, with fasting labs prior.  Please confirm he wants to establish with me, and schedule.  Future orders placed.

## 2019-05-16 NOTE — LETTER
[facility]  [address] [address]  [phone]  May 30, 2019    Al Smith  [address] [facility] [address]    Dear Tian,    We care about your health and based on a review of your medical records, recommend the the following, to better manage your health:      You are in particular need of attention regarding:  -Diabetes    I am recommending that you:     -schedule a FOLLOWUP OFFICE APPOINTMENT with me.  I will recheck your: A1c test.      Here is a list of Health Maintenance topics that are due now or due soon:  Health Maintenance Due   Topic Date Due     Preventive Care Visit  1980     One-time HIV Screening  07/06/1997     Diabetic Foot Exam  04/14/2018     A1C Lab  07/17/2018     PHQ-2  01/01/2019     Cholesterol Lab  01/17/2019     Kidney Function Urine Test  01/17/2019       Please call us at [phone] or [phone] (or use Fnbox) to address the above recommendations.     Thank you for trusting [facility].  We appreciate the opportunity to serve you and look forward to supporting your healthcare needs in the future.    If you have (or plan to have) any of these tests done at a facility other than a [facility] or a [facility], please have the results from these tests sent to your primary physician at [facility].    Healthy Regards,    Thom Pina MD/Serina Ruiz [facility]

## 2019-05-23 NOTE — TELEPHONE ENCOUNTER
Panel Management Review      Patient Active Problem List   Diagnosis     Morbid obesity (H)     Seasonal allergic rhinitis     Mixed hyperlipidemia     Essential hypertension     Other male erectile dysfunction     Type 2 diabetes mellitus without complication, without long-term current use of insulin (H)       Composite cancer screening  Chart review shows that this patient is due/due soon for the following None  Summary:    Patient is due/failing the following:   A1C    Action needed:   Patient needs office visit for Diabetic f/u . and Patient needs lab as well.     Type of outreach:    Phone, left message for patient to call back.  This is my 2nd attempt.     3rd attempt: letter sent to pt.     Questions for provider review:    None                                                                                                                                    Serina Ruiz

## 2019-07-31 DIAGNOSIS — E11.9 TYPE 2 DIABETES MELLITUS WITHOUT COMPLICATION, WITHOUT LONG-TERM CURRENT USE OF INSULIN (H): ICD-10-CM

## 2019-07-31 LAB
ALBUMIN SERPL-MCNC: 4.1 G/DL (ref 3.4–5)
ALP SERPL-CCNC: 55 U/L (ref 40–150)
ALT SERPL W P-5'-P-CCNC: 59 U/L (ref 0–70)
ANION GAP SERPL CALCULATED.3IONS-SCNC: 9 MMOL/L (ref 3–14)
AST SERPL W P-5'-P-CCNC: 30 U/L (ref 0–45)
BILIRUB SERPL-MCNC: 0.4 MG/DL (ref 0.2–1.3)
BUN SERPL-MCNC: 10 MG/DL (ref 7–30)
CALCIUM SERPL-MCNC: 8.8 MG/DL (ref 8.5–10.1)
CHLORIDE SERPL-SCNC: 106 MMOL/L (ref 94–109)
CHOLEST SERPL-MCNC: 192 MG/DL
CO2 SERPL-SCNC: 25 MMOL/L (ref 20–32)
CREAT SERPL-MCNC: 0.72 MG/DL (ref 0.66–1.25)
CREAT UR-MCNC: 54 MG/DL
GFR SERPL CREATININE-BSD FRML MDRD: >90 ML/MIN/{1.73_M2}
GLUCOSE SERPL-MCNC: 142 MG/DL (ref 70–99)
HBA1C MFR BLD: 6.9 % (ref 0–5.6)
HDLC SERPL-MCNC: 34 MG/DL
LDLC SERPL CALC-MCNC: 128 MG/DL
MICROALBUMIN UR-MCNC: 6 MG/L
MICROALBUMIN/CREAT UR: 10.5 MG/G CR (ref 0–17)
NONHDLC SERPL-MCNC: 158 MG/DL
POTASSIUM SERPL-SCNC: 4.5 MMOL/L (ref 3.4–5.3)
PROT SERPL-MCNC: 7.7 G/DL (ref 6.8–8.8)
SODIUM SERPL-SCNC: 140 MMOL/L (ref 133–144)
TRIGL SERPL-MCNC: 151 MG/DL
TSH SERPL DL<=0.005 MIU/L-ACNC: 1.16 MU/L (ref 0.4–4)

## 2019-07-31 PROCEDURE — 80053 COMPREHEN METABOLIC PANEL: CPT | Performed by: INTERNAL MEDICINE

## 2019-07-31 PROCEDURE — 84443 ASSAY THYROID STIM HORMONE: CPT | Performed by: INTERNAL MEDICINE

## 2019-07-31 PROCEDURE — 80061 LIPID PANEL: CPT | Performed by: INTERNAL MEDICINE

## 2019-07-31 PROCEDURE — 83036 HEMOGLOBIN GLYCOSYLATED A1C: CPT | Performed by: INTERNAL MEDICINE

## 2019-07-31 PROCEDURE — 36415 COLL VENOUS BLD VENIPUNCTURE: CPT | Performed by: INTERNAL MEDICINE

## 2019-07-31 PROCEDURE — 82043 UR ALBUMIN QUANTITATIVE: CPT | Performed by: INTERNAL MEDICINE

## 2019-08-02 ENCOUNTER — OFFICE VISIT (OUTPATIENT)
Dept: INTERNAL MEDICINE | Facility: CLINIC | Age: 39
End: 2019-08-02
Payer: COMMERCIAL

## 2019-08-02 VITALS
TEMPERATURE: 98.7 F | RESPIRATION RATE: 16 BRPM | SYSTOLIC BLOOD PRESSURE: 160 MMHG | BODY MASS INDEX: 44.1 KG/M2 | HEIGHT: 71 IN | HEART RATE: 81 BPM | WEIGHT: 315 LBS | DIASTOLIC BLOOD PRESSURE: 80 MMHG | OXYGEN SATURATION: 98 %

## 2019-08-02 DIAGNOSIS — I10 ESSENTIAL HYPERTENSION: ICD-10-CM

## 2019-08-02 DIAGNOSIS — E66.01 MORBID OBESITY (H): ICD-10-CM

## 2019-08-02 DIAGNOSIS — E11.9 TYPE 2 DIABETES MELLITUS WITHOUT COMPLICATION, WITHOUT LONG-TERM CURRENT USE OF INSULIN (H): Primary | ICD-10-CM

## 2019-08-02 PROCEDURE — 99214 OFFICE O/P EST MOD 30 MIN: CPT | Performed by: INTERNAL MEDICINE

## 2019-08-02 RX ORDER — LISINOPRIL 20 MG/1
20 TABLET ORAL DAILY
Qty: 90 TABLET | Refills: 3 | Status: SHIPPED | OUTPATIENT
Start: 2019-08-02 | End: 2022-01-07

## 2019-08-02 RX ORDER — HYDROCHLOROTHIAZIDE 25 MG/1
TABLET ORAL
Qty: 90 TABLET | Refills: 3 | Status: SHIPPED | OUTPATIENT
Start: 2019-08-02 | End: 2022-01-07

## 2019-08-02 ASSESSMENT — MIFFLIN-ST. JEOR: SCORE: 2390.93

## 2019-08-02 NOTE — PROGRESS NOTES
Subjective     Al Smith is a 39 year old male who presents to clinic today for the following health issues:    HPI   Diabetes Follow-up      How often are you checking your blood sugar? Twice a week    What time of day are you checking your blood sugars (select all that apply)?  Before meals    Have you had any blood sugars above 200?  No    Have you had any blood sugars below 70?  No    What symptoms do you notice when your blood sugar is low?  None    What concerns do you have today about your diabetes? None     Do you have any of these symptoms? (Select all that apply)  Weight gain     Have you had a diabetic eye exam in the last 12 months? No but, at the eye doctor twice a year    BP Readings from Last 2 Encounters:   08/02/19 (!) 160/80   08/02/18 135/80     Hemoglobin A1C (%)   Date Value   07/31/2019 6.9 (H)   01/17/2018 5.8     LDL Cholesterol Calculated (mg/dL)   Date Value   07/31/2019 128 (H)   01/17/2018 128 (H)       Diabetes Management Resources    Amount of exercise or physical activity: None    Problems taking medications regularly: No    Medication side effects: metformin gives him upset stomach and loose stool    Diet: low salt and watching carbs        Patient has not been on his metformin for at least the last 2 to 3 months, probably longer.  He stopped medication as it was causing uncomfortable GI side effects.  Also, he has been off his lisinopril and hydrochlorothiazide for at least that long.  Today's blood pressure noted.    Still struggles with his weight, regular activity has been a struggle recently due to a recent knee injury, for which he plans to follow-up with orthopedic surgery in the near future.        Reviewed and updated as needed this visit by Provider  Tobacco  Allergies  Meds  Problems  Med Hx  Surg Hx  Fam Hx         Review of Systems   ROS COMP: Constitutional, HEENT, cardiovascular, pulmonary, GI, , musculoskeletal, neuro, skin, endocrine and psych systems  "are negative, except as otherwise noted.      Objective    BP (!) 160/80   Pulse 81   Temp 98.7  F (37.1  C) (Oral)   Resp 16   Ht 1.803 m (5' 11\")   Wt 144.4 kg (318 lb 4.8 oz)   SpO2 98%   BMI 44.39 kg/m    Body mass index is 44.39 kg/m .  Physical Exam   GENERAL: healthy, alert and no distress  NECK: no adenopathy, no asymmetry, masses, or scars and thyroid normal to palpation  RESP: lungs clear to auscultation - no rales, rhonchi or wheezes  CV: regular rate and rhythm, normal S1 S2, no S3 or S4, no murmur, click or rub, no peripheral edema and peripheral pulses strong  ABDOMEN: soft, nontender, no hepatosplenomegaly, no masses and bowel sounds normal  MS: no gross musculoskeletal defects noted, no edema            Assessment & Plan     1. Type 2 diabetes mellitus without complication, without long-term current use of insulin (H)  Glycosylated hemoglobin just under 7 basically off hyperglycemic medication.  He clearly has room to improve therapeutic lifestyle changes, lose weight, etc.  As such, we will hold off on hypoglycemic medication, recheck in 3 months.  If requires medication Victoza, or another incretin analog would be an appropriate choice.    2. Essential hypertension  Restart lisinopril and hydrochlorothiazide, recheck in 3 months with repeat chemistries.  - hydrochlorothiazide (HYDRODIURIL) 25 MG tablet; TAKE 1 TABLET (25 MG) BY MOUTH DAILY  Dispense: 90 tablet; Refill: 3  - lisinopril (PRINIVIL/ZESTRIL) 20 MG tablet; Take 1 tablet (20 mg) by mouth daily  Dispense: 90 tablet; Refill: 3  - Basic metabolic panel; Future  - Hemoglobin A1c; Future    3. Morbid obesity (H)  Reiterated the importance of dietary modification and weight loss       BMI:   Estimated body mass index is 44.39 kg/m  as calculated from the following:    Height as of this encounter: 1.803 m (5' 11\").    Weight as of this encounter: 144.4 kg (318 lb 4.8 oz).   Weight management plan: Discussed healthy diet and exercise " guidelines        See Patient Instructions    Return in about 3 months (around 11/2/2019) for Diabetes Check, with pre-visit non-fasting lab.    Horace Pina MD  [facility]

## 2019-08-02 NOTE — LETTER
[facility]  [address] [address]  [phone]        February 11, 2020    Al Smith  [address] [address]              Dear Al Smith    This is to remind you that your non-fasting labs is due.    You may call our office at [phone] to schedule an appointment.    Please disregard this notice if you have already had your labs drawn or made an appointment.        Sincerely,        Horace Pina MD

## 2019-08-02 NOTE — PATIENT INSTRUCTIONS
- Start taking the lisinopril (20 mg daily) and HCTZ.  (Prescriptions have been sent to your pharmacy)     - Please follow-up with me in clinic in 3 months.  - Please come in for non-fasting labs, a few days prior to the appointment with me.  You may schedule appointments at our , through [employer], or by calling [phone].

## 2019-08-06 ENCOUNTER — TRANSFERRED RECORDS (OUTPATIENT)
Dept: HEALTH INFORMATION MANAGEMENT | Facility: CLINIC | Age: 39
End: 2019-08-06

## 2019-08-06 DIAGNOSIS — E11.9 TYPE 2 DIABETES MELLITUS WITHOUT COMPLICATION, WITHOUT LONG-TERM CURRENT USE OF INSULIN (H): ICD-10-CM

## 2019-08-06 NOTE — TELEPHONE ENCOUNTER
"Requested Prescriptions   Pending Prescriptions Disp Refills     metFORMIN (GLUCOPHAGE) 500 MG tablet [Pharmacy Med Name: METFORMIN  MG TABLET]  DISCONTINUED  Last Written Prescription Date:  5/30/2019 END: 8/2/2019  Last Fill Quantity: 60 tablet,  # refills: 0   Last office visit: 1/17/2018 with prescribing provider:  Leighton   Future Office Visit:     60 tablet 0     Sig: DUE FOR OFFICE VISIT *TAKE 1 TABLET (500 MG) BY MOUTH 2 TIMES DAILY (WITH MEALS)       Biguanide Agents Failed - 8/6/2019  9:16 AM        Failed - Blood pressure less than 140/90 in past 6 months     BP Readings from Last 3 Encounters:   08/02/19 (!) 160/80   08/02/18 135/80   07/31/18 (!) 152/95                 Failed - Medication is active on med list        Failed - Recent (6 mo) or future (30 days) visit within the authorizing provider's specialty     Patient had office visit in the last 6 months or has a visit in the next 30 days with authorizing provider or within the authorizing provider's specialty.  See \"Patient Info\" tab in inbasket, or \"Choose Columns\" in Meds & Orders section of the refill encounter.            Passed - Patient has documented LDL within the past 12 mos.     Recent Labs   Lab Test 07/31/19  0853   *             Passed - Patient has had a Microalbumin in the past 15 mos.     Recent Labs   Lab Test 07/31/19  0853   MICROL 6   UMALCR 10.50             Passed - Patient is age 10 or older        Passed - Patient has documented A1c within the specified period of time.     If HgbA1C is 8 or greater, it needs to be on file within the past 3 months.  If less than 8, must be on file within the past 6 months.     Recent Labs   Lab Test 07/31/19  0853   A1C 6.9*             Passed - Patient's CR is NOT>1.4 OR Patient's EGFR is NOT<45 within past 12 mos.     Recent Labs   Lab Test 07/31/19  0853   GFRESTIMATED >90   GFRESTBLACK >90       Recent Labs   Lab Test 07/31/19  0853   CR 0.72             Passed - Patient " does NOT have a diagnosis of CHF.

## 2019-08-23 NOTE — TELEPHONE ENCOUNTER
Patient and I decided at his last visit with me, that we are not going to continue metformin for now, and he is going to work toward therapeutic lifestyle changes.

## 2019-09-25 ENCOUNTER — OFFICE VISIT (OUTPATIENT)
Dept: INTERNAL MEDICINE | Facility: CLINIC | Age: 39
End: 2019-09-25
Payer: COMMERCIAL

## 2019-09-25 VITALS
WEIGHT: 315 LBS | OXYGEN SATURATION: 98 % | BODY MASS INDEX: 44.18 KG/M2 | HEART RATE: 85 BPM | RESPIRATION RATE: 16 BRPM | DIASTOLIC BLOOD PRESSURE: 80 MMHG | SYSTOLIC BLOOD PRESSURE: 120 MMHG | TEMPERATURE: 98.2 F

## 2019-09-25 DIAGNOSIS — L40.9 PSORIASIS: ICD-10-CM

## 2019-09-25 DIAGNOSIS — I10 ESSENTIAL HYPERTENSION: ICD-10-CM

## 2019-09-25 DIAGNOSIS — E66.01 MORBID OBESITY (H): ICD-10-CM

## 2019-09-25 DIAGNOSIS — Z01.818 PREOP GENERAL PHYSICAL EXAM: Primary | ICD-10-CM

## 2019-09-25 DIAGNOSIS — E11.9 TYPE 2 DIABETES MELLITUS WITHOUT COMPLICATION, WITHOUT LONG-TERM CURRENT USE OF INSULIN (H): ICD-10-CM

## 2019-09-25 PROCEDURE — 93000 ELECTROCARDIOGRAM COMPLETE: CPT | Performed by: INTERNAL MEDICINE

## 2019-09-25 PROCEDURE — 99214 OFFICE O/P EST MOD 30 MIN: CPT | Performed by: INTERNAL MEDICINE

## 2019-09-25 RX ORDER — TRIAMCINOLONE ACETONIDE 1 MG/G
CREAM TOPICAL 2 TIMES DAILY
Qty: 30 G | Refills: 3 | Status: SHIPPED | OUTPATIENT
Start: 2019-09-25 | End: 2021-04-28

## 2019-10-31 ENCOUNTER — HEALTH MAINTENANCE LETTER (OUTPATIENT)
Age: 39
End: 2019-10-31

## 2020-02-08 ENCOUNTER — HEALTH MAINTENANCE LETTER (OUTPATIENT)
Age: 40
End: 2020-02-08

## 2021-01-15 ENCOUNTER — HEALTH MAINTENANCE LETTER (OUTPATIENT)
Age: 41
End: 2021-01-15

## 2021-04-28 DIAGNOSIS — L40.9 PSORIASIS: ICD-10-CM

## 2021-04-28 RX ORDER — TRIAMCINOLONE ACETONIDE 1 MG/G
CREAM TOPICAL 2 TIMES DAILY
Qty: 30 G | Refills: 0 | Status: SHIPPED | OUTPATIENT
Start: 2021-04-28 | End: 2022-01-07

## 2021-04-28 NOTE — TELEPHONE ENCOUNTER
Routing refill request to provider for review/approval because:  Patient needs to be seen because it has been more than 1 year since last office visit.  Last prescribed >year ago

## 2021-09-05 ENCOUNTER — HEALTH MAINTENANCE LETTER (OUTPATIENT)
Age: 41
End: 2021-09-05

## 2022-01-07 ENCOUNTER — OFFICE VISIT (OUTPATIENT)
Dept: URGENT CARE | Facility: URGENT CARE | Age: 42
End: 2022-01-07
Payer: COMMERCIAL

## 2022-01-07 VITALS
HEART RATE: 89 BPM | TEMPERATURE: 99 F | DIASTOLIC BLOOD PRESSURE: 90 MMHG | RESPIRATION RATE: 18 BRPM | OXYGEN SATURATION: 98 % | SYSTOLIC BLOOD PRESSURE: 162 MMHG

## 2022-01-07 DIAGNOSIS — R09.89 RHONCHI: Primary | ICD-10-CM

## 2022-01-07 DIAGNOSIS — I10 ESSENTIAL HYPERTENSION: ICD-10-CM

## 2022-01-07 PROCEDURE — 99213 OFFICE O/P EST LOW 20 MIN: CPT | Performed by: PHYSICIAN ASSISTANT

## 2022-01-07 RX ORDER — HYDROCHLOROTHIAZIDE 25 MG/1
TABLET ORAL
Qty: 90 TABLET | Refills: 0 | Status: SHIPPED | OUTPATIENT
Start: 2022-01-07 | End: 2022-06-13

## 2022-01-07 RX ORDER — LISINOPRIL 20 MG/1
20 TABLET ORAL DAILY
Qty: 90 TABLET | Refills: 0 | Status: SHIPPED | OUTPATIENT
Start: 2022-01-07 | End: 2022-06-13

## 2022-01-07 RX ORDER — ALBUTEROL SULFATE 90 UG/1
2-4 AEROSOL, METERED RESPIRATORY (INHALATION) EVERY 4 HOURS PRN
Qty: 18 G | Refills: 1 | Status: SHIPPED | OUTPATIENT
Start: 2022-01-07

## 2022-01-07 RX ORDER — PREDNISONE 20 MG/1
40 TABLET ORAL DAILY
Qty: 14 TABLET | Refills: 0 | Status: SHIPPED | OUTPATIENT
Start: 2022-01-07 | End: 2022-01-14

## 2022-01-07 NOTE — PROGRESS NOTES
SUBJECTIVE:   Al Smith is a 41 year old male presenting with a chief complaint of cough that is rattling in chest and did have hoarse voice .  Was productive and now dry  Feels like in upper chest.  Had 4 negative COVID test.  No fevers.  No SOB or chest pain.  Vaccinated  Otherwise doing well.  No respiratory issues underlying   Onset of symptoms was 3 day(s) ago.  Course of illness is same.    Severity moderate  Current and Associated symptoms: negative other than stated above  Treatment measures tried include Tylenol/Ibuprofen, Fluids, Rest and Mucinex.  Predisposing factors include None.    Also is asking for refill on his BP med that out of until can get in to see his PCP     Past Medical History:   Diagnosis Date     Hypertension      Current Outpatient Medications   Medication Sig Dispense Refill     blood glucose monitoring (ONE TOUCH DELICA) lancets Use to test blood sugar 1 times daily or as directed.  Ok to substitute alternative if insurance prefers. 100 each 1     blood glucose monitoring (ONE TOUCH VERIO IQ) test strip Use to test blood sugars 1 times daily or as directed. 100 each 1     Social History     Tobacco Use     Smoking status: Never Smoker     Smokeless tobacco: Never Used   Substance Use Topics     Alcohol use: Yes     Comment: socially       ROS:  Review of systems negative except as stated above.    OBJECTIVE:  BP (!) 162/90   Pulse 89   Temp 99  F (37.2  C) (Tympanic)   Resp 18   SpO2 98%   GENERAL APPEARANCE: healthy, alert and no distress  EYES: EOMI,  PERRL, conjunctiva clear  HENT: ear canals and TM's normal.  Nose and mouth without ulcers, erythema or lesions  NECK: supple, nontender, no lymphadenopathy  RESP: rhonchi throughout and expiratory wheezing noted   CV: regular rates and rhythm, normal S1 S2, no murmur noted  NEURO: Normal strength and tone, sensory exam grossly normal,  normal speech and mentation  SKIN: no suspicious lesions or  billy    assessment/plan:  (R09.89) Rhonchi  (primary encounter diagnosis)  Comment:   Plan: predniSONE (DELTASONE) 20 MG tablet, albuterol         (PROAIR HFA/PROVENTIL HFA/VENTOLIN HFA) 108 (90        Base) MCG/ACT inhaler          Med as directed and OTC med for sx relief.  Continue to push fluids.  No signs of bacterial infection.  Red flag signs discussed and to Follow-up with PCP as needed continue to monitor for fevers, SOB or chest pain or any worsening sx.      (I10) Essential hypertension  Comment:   Plan: hydrochlorothiazide (HYDRODIURIL) 25 MG tablet,        lisinopril (ZESTRIL) 20 MG tablet           Restart med that out of and Follow-up with PCP  Diet and exercise encouraged

## 2022-01-31 ENCOUNTER — VIRTUAL VISIT (OUTPATIENT)
Dept: INTERNAL MEDICINE | Facility: CLINIC | Age: 42
End: 2022-01-31
Payer: COMMERCIAL

## 2022-01-31 DIAGNOSIS — E11.9 TYPE 2 DIABETES MELLITUS WITHOUT COMPLICATION, WITHOUT LONG-TERM CURRENT USE OF INSULIN (H): Primary | ICD-10-CM

## 2022-01-31 DIAGNOSIS — Z13.220 SCREENING FOR HYPERLIPIDEMIA: ICD-10-CM

## 2022-01-31 DIAGNOSIS — Z11.59 NEED FOR HEPATITIS C SCREENING TEST: ICD-10-CM

## 2022-01-31 DIAGNOSIS — Z11.4 SCREENING FOR HIV (HUMAN IMMUNODEFICIENCY VIRUS): ICD-10-CM

## 2022-01-31 PROCEDURE — 99207 PR NO CHARGE LOS: CPT | Mod: 95 | Performed by: INTERNAL MEDICINE

## 2022-01-31 NOTE — PROGRESS NOTES
Tian is a 41 year old who is being evaluated via a billable video visit.      How would you like to obtain your AVS? MyChart  If the video visit is dropped, the invitation should be resent by: Send to e-mail at: [email or web address] ([employer])  Will anyone else be joining your video visit? No    Video Start Time:     Assessment & Plan     Screening for HIV (human immunodeficiency virus)      Need for hepatitis C screening test      Screening for hyperlipidemia      Type 2 diabetes mellitus without complication, without long-term current use of insulin (H)  Need to re-check surveillance labs  - Lipid panel reflex to direct LDL Fasting; Future  - Comprehensive metabolic panel; Future  - Hemoglobin A1c; Future  - Albumin Random Urine Quantitative with Creat Ratio; Future  - TSH with free T4 reflex; Future                 No follow-ups on file.    Horace Pina MD  [facility]   Tian is a 41 year old who presents for the following health issues     HPI     Diabetes Follow-up    How often are you checking your blood sugar? One time daily  What time of day are you checking your blood sugars (select all that apply)?  Before meals  Have you had any blood sugars above 200?  Yes 260  Have you had any blood sugars below 70?  No    What symptoms do you notice when your blood sugar is low?  None    What concerns do you have today about your diabetes? None and Other: Getting back on track again with checking numbers      Do you have any of these symptoms? (Select all that apply)  No numbness or tingling in feet.  No redness, sores or blisters on feet.  No complaints of excessive thirst.  No reports of blurry vision.  No significant changes to weight.    Have you had a diabetic eye exam in the last 12 months? No        BP Readings from Last 2 Encounters:   01/07/22 (!) 162/90   09/25/19 120/80     Hemoglobin A1C POCT (%)   Date Value   07/31/2019 6.9 (H)   01/17/2018 5.8     LDL Cholesterol  Calculated (mg/dL)   Date Value   07/31/2019 128 (H)   01/17/2018 128 (H)                     Review of Systems   Constitutional, HEENT, cardiovascular, pulmonary, GI, , musculoskeletal, neuro, skin, endocrine and psych systems are negative, except as otherwise noted.      Objective           Vitals:  No vitals were obtained today due to virtual visit.    Physical Exam   GENERAL: Healthy, alert and no distress  EYES: Eyes grossly normal to inspection.  No discharge or erythema, or obvious scleral/conjunctival abnormalities.  RESP: No audible wheeze, cough, or visible cyanosis.  No visible retractions or increased work of breathing.    SKIN: Visible skin clear. No significant rash, abnormal pigmentation or lesions.  NEURO: Cranial nerves grossly intact.  Mentation and speech appropriate for age.  PSYCH: Mentation appears normal, affect normal/bright, judgement and insight intact, normal speech and appearance well-groomed.                Video-Visit Details    Type of service:  Video Visit    Video End Time:    Originating Location (pt. Location):     Distant Location (provider location):  [facility]     Platform used for Video Visit:

## 2022-02-09 ENCOUNTER — LAB (OUTPATIENT)
Dept: LAB | Facility: CLINIC | Age: 42
End: 2022-02-09
Payer: COMMERCIAL

## 2022-02-09 DIAGNOSIS — E11.9 TYPE 2 DIABETES MELLITUS WITHOUT COMPLICATION, WITHOUT LONG-TERM CURRENT USE OF INSULIN (H): ICD-10-CM

## 2022-02-09 LAB
ALBUMIN SERPL-MCNC: 3.9 G/DL (ref 3.4–5)
ALP SERPL-CCNC: 65 U/L (ref 40–150)
ALT SERPL W P-5'-P-CCNC: 53 U/L (ref 0–70)
ANION GAP SERPL CALCULATED.3IONS-SCNC: 5 MMOL/L (ref 3–14)
AST SERPL W P-5'-P-CCNC: 19 U/L (ref 0–45)
BILIRUB SERPL-MCNC: 0.6 MG/DL (ref 0.2–1.3)
BUN SERPL-MCNC: 10 MG/DL (ref 7–30)
CALCIUM SERPL-MCNC: 9.6 MG/DL (ref 8.5–10.1)
CHLORIDE BLD-SCNC: 98 MMOL/L (ref 94–109)
CHOLEST SERPL-MCNC: 215 MG/DL
CO2 SERPL-SCNC: 29 MMOL/L (ref 20–32)
CREAT SERPL-MCNC: 0.68 MG/DL (ref 0.66–1.25)
CREAT UR-MCNC: 110 MG/DL
FASTING STATUS PATIENT QL REPORTED: YES
GFR SERPL CREATININE-BSD FRML MDRD: >90 ML/MIN/1.73M2
GLUCOSE BLD-MCNC: 253 MG/DL (ref 70–99)
HBA1C MFR BLD: 10.1 % (ref 0–5.6)
HDLC SERPL-MCNC: 37 MG/DL
LDLC SERPL CALC-MCNC: 144 MG/DL
MICROALBUMIN UR-MCNC: 14 MG/L
MICROALBUMIN/CREAT UR: 12.73 MG/G CR (ref 0–17)
NONHDLC SERPL-MCNC: 178 MG/DL
POTASSIUM BLD-SCNC: 4.5 MMOL/L (ref 3.4–5.3)
PROT SERPL-MCNC: 8 G/DL (ref 6.8–8.8)
SODIUM SERPL-SCNC: 132 MMOL/L (ref 133–144)
TRIGL SERPL-MCNC: 170 MG/DL
TSH SERPL DL<=0.005 MIU/L-ACNC: 0.94 MU/L (ref 0.4–4)

## 2022-02-09 PROCEDURE — 82043 UR ALBUMIN QUANTITATIVE: CPT

## 2022-02-09 PROCEDURE — 80053 COMPREHEN METABOLIC PANEL: CPT

## 2022-02-09 PROCEDURE — 83036 HEMOGLOBIN GLYCOSYLATED A1C: CPT

## 2022-02-09 PROCEDURE — 84443 ASSAY THYROID STIM HORMONE: CPT

## 2022-02-09 PROCEDURE — 36415 COLL VENOUS BLD VENIPUNCTURE: CPT

## 2022-02-09 PROCEDURE — 80061 LIPID PANEL: CPT

## 2022-02-20 ENCOUNTER — HEALTH MAINTENANCE LETTER (OUTPATIENT)
Age: 42
End: 2022-02-20

## 2022-03-13 ENCOUNTER — OFFICE VISIT (OUTPATIENT)
Dept: URGENT CARE | Facility: URGENT CARE | Age: 42
End: 2022-03-13
Payer: COMMERCIAL

## 2022-03-13 VITALS
SYSTOLIC BLOOD PRESSURE: 181 MMHG | HEART RATE: 79 BPM | OXYGEN SATURATION: 97 % | RESPIRATION RATE: 16 BRPM | DIASTOLIC BLOOD PRESSURE: 98 MMHG | TEMPERATURE: 97.8 F

## 2022-03-13 DIAGNOSIS — J40 BRONCHITIS: ICD-10-CM

## 2022-03-13 DIAGNOSIS — R05.9 COUGH: Primary | ICD-10-CM

## 2022-03-13 DIAGNOSIS — E11.9 TYPE 2 DIABETES MELLITUS WITHOUT COMPLICATION, WITHOUT LONG-TERM CURRENT USE OF INSULIN (H): ICD-10-CM

## 2022-03-13 LAB
FLUAV AG SPEC QL IA: NEGATIVE
FLUBV AG SPEC QL IA: NEGATIVE

## 2022-03-13 PROCEDURE — 87804 INFLUENZA ASSAY W/OPTIC: CPT | Performed by: FAMILY MEDICINE

## 2022-03-13 PROCEDURE — U0003 INFECTIOUS AGENT DETECTION BY NUCLEIC ACID (DNA OR RNA); SEVERE ACUTE RESPIRATORY SYNDROME CORONAVIRUS 2 (SARS-COV-2) (CORONAVIRUS DISEASE [COVID-19]), AMPLIFIED PROBE TECHNIQUE, MAKING USE OF HIGH THROUGHPUT TECHNOLOGIES AS DESCRIBED BY CMS-2020-01-R: HCPCS | Performed by: FAMILY MEDICINE

## 2022-03-13 PROCEDURE — U0005 INFEC AGEN DETEC AMPLI PROBE: HCPCS | Performed by: FAMILY MEDICINE

## 2022-03-13 PROCEDURE — 99214 OFFICE O/P EST MOD 30 MIN: CPT | Performed by: FAMILY MEDICINE

## 2022-03-13 RX ORDER — DOXYCYCLINE 100 MG/1
100 CAPSULE ORAL 2 TIMES DAILY
Qty: 20 CAPSULE | Refills: 0 | Status: SHIPPED | OUTPATIENT
Start: 2022-03-13 | End: 2022-03-23

## 2022-03-13 NOTE — PATIENT INSTRUCTIONS

## 2022-03-13 NOTE — PROGRESS NOTES
ASSESSMENT/ PLAN:       Bronchitis  - Symptomatic; Yes; 2/27/2022 COVID-19 Virus (Coronavirus) by PCR Nose  - Influenza A & B Antigen  - doxycycline hyclate (VIBRAMYCIN) 100 MG capsule; Take 1 capsule (100 mg) by mouth 2 times daily for 10 days     We discussed that based on the patient's description of symptoms, history and physical exam, that a bacterial infection has likely developed in the chest requiring treatment with antibiotics.     The patient is advised to monitor the symptoms of the illness, and if worsening,  worse chest pain, increased productive sputum, persistent fevers/ chills, shortness of breath, then seek re-evaluation with primary care, UC or ER     Albuterol inhaler 1-2 puffs q 4-6 hours prn wheezing or short of breath-  He has at home, no Rx needed       Symptomatic measures encouraged, humidified air, plenty of fluids.  Patient may consider OTC expectorant and/or cough suppressant to treat symptoms.   acetaminophen, ibuprofen for pain and fever    Discussed that if covid test is positive-  He would be past the infectious stage of illness    Return if worsening    Type 2 diabetes mellitus without complication, without long-term current use of insulin (H)     Had Hgb A1c elevated at 10.1 three weeks ago-  This was a new significant elevation- primary care has advised patient to arrange appointment to discuss diabetes treatment        -------------------------------------------------------------------------------------------------------------------------------    SUBJECTIVE:  Chief Complaint   Patient presents with     Urgent Care     cough, stuffy nose, nasal drainage for two weeks.      Al Smith is a 41 year old male who presents to the clinic today with a chief complaint of cough with chest congestiong  for 2 week(s).  Patient denies shortness of breath., central chest pain., pleuritic chest pain and wheezing.  His cough is described as persistent, daytime, nightime and  productive of yellow sputum.    The patient's symptoms are moderate and worsening.  Associated symptoms include nasal congestion and malaise. The patient's symptoms are exacerbated by no particular triggers  Patient has been using albuterol inhaler  to improve symptoms.    Has been diagnosed with diabetes recently, but has not started medications yet    Past Medical History:   Diagnosis Date     Hypertension      Patient Active Problem List   Diagnosis     Morbid obesity (H)     Seasonal allergic rhinitis     Mixed hyperlipidemia     Essential hypertension     Other male erectile dysfunction     Type 2 diabetes mellitus without complication, without long-term current use of insulin (H)       ALLERGIES:  Cat hair [cats], Dog hair [dogs], Dust mite extract, and Pollen [pollen extract]    MEDs  albuterol (PROAIR HFA/PROVENTIL HFA/VENTOLIN HFA) 108 (90 Base) MCG/ACT inhaler, Inhale 2-4 puffs into the lungs every 4 hours as needed for shortness of breath / dyspnea or wheezing  blood glucose monitoring (ONE TOUCH DELICA) lancets, Use to test blood sugar 1 times daily or as directed.  Ok to substitute alternative if insurance prefers.  blood glucose monitoring (ONE TOUCH VERIO IQ) test strip, Use to test blood sugars 1 times daily or as directed.  hydrochlorothiazide (HYDRODIURIL) 25 MG tablet, TAKE 1 TABLET (25 MG) BY MOUTH DAILY  lisinopril (ZESTRIL) 20 MG tablet, Take 1 tablet (20 mg) by mouth daily    No current facility-administered medications on file prior to visit.      Social History     Tobacco Use     Smoking status: Never Smoker     Smokeless tobacco: Never Used   Substance Use Topics     Alcohol use: Yes     Comment: socially       Family History   Problem Relation Age of Onset     Diabetes Father      Arthritis Mother          ROS  CONSTITUTIONAL:NEGATIVE for fever, chills,   INTEGUMENTARY/SKIN: NEGATIVE for worrisome rashes,  or lesions  EYES: NEGATIVE for vision changes or irritation  ENT/MOUTH: NEGATIVE  for ear, mouth and throat problems  GI: NEGATIVE for nausea, abdominal pain,   or change in bowel habits    OBJECTIVE:  BP (!) 181/98   Pulse 79   Temp 97.8  F (36.6  C) (Tympanic)   Resp 16   SpO2 97%   GENERAL APPEARANCE: alert, moderate distress and cooperative  EYES: EOMI,  PERRL, conjunctiva clear  HENT: ear canals and TM's normal.  Nose and mouth without ulcers, erythema or lesions  NECK: supple, nontender, no lymphadenopathy  RESP: rhonchi little  Bilateral, no wheezing  CV: regular rates and rhythm, normal S1 S2, no murmur noted  NEURO: Normal strength and tone, sensory exam grossly normal,  normal speech and mentation  SKIN: no suspicious lesions or rashes       Results for orders placed or performed in visit on 03/13/22   Influenza A & B Antigen     Status: Normal    Specimen: Nose; Swab   Result Value Ref Range    Influenza A antigen Negative Negative    Influenza B antigen Negative Negative    Narrative    Test results must be correlated with clinical data. If necessary, results should be confirmed by a molecular assay or viral culture.

## 2022-03-14 LAB — SARS-COV-2 RNA RESP QL NAA+PROBE: NEGATIVE

## 2022-03-30 ENCOUNTER — TRANSFERRED RECORDS (OUTPATIENT)
Dept: HEALTH INFORMATION MANAGEMENT | Facility: CLINIC | Age: 42
End: 2022-03-30
Payer: COMMERCIAL

## 2022-03-30 LAB — RETINOPATHY: NEGATIVE

## 2022-06-13 ENCOUNTER — OFFICE VISIT (OUTPATIENT)
Dept: INTERNAL MEDICINE | Facility: CLINIC | Age: 42
End: 2022-06-13
Payer: COMMERCIAL

## 2022-06-13 VITALS
OXYGEN SATURATION: 97 % | WEIGHT: 315 LBS | BODY MASS INDEX: 44.73 KG/M2 | SYSTOLIC BLOOD PRESSURE: 150 MMHG | HEART RATE: 85 BPM | TEMPERATURE: 97 F | RESPIRATION RATE: 16 BRPM | DIASTOLIC BLOOD PRESSURE: 90 MMHG

## 2022-06-13 DIAGNOSIS — I10 ESSENTIAL HYPERTENSION: ICD-10-CM

## 2022-06-13 DIAGNOSIS — E11.9 TYPE 2 DIABETES MELLITUS WITHOUT COMPLICATION, WITHOUT LONG-TERM CURRENT USE OF INSULIN (H): Primary | ICD-10-CM

## 2022-06-13 PROCEDURE — 99214 OFFICE O/P EST MOD 30 MIN: CPT | Performed by: INTERNAL MEDICINE

## 2022-06-13 RX ORDER — LISINOPRIL 20 MG/1
20 TABLET ORAL DAILY
Qty: 90 TABLET | Refills: 3 | Status: SHIPPED | OUTPATIENT
Start: 2022-06-13 | End: 2023-06-14

## 2022-06-13 RX ORDER — HYDROCHLOROTHIAZIDE 25 MG/1
TABLET ORAL
Qty: 90 TABLET | Refills: 3 | Status: SHIPPED | OUTPATIENT
Start: 2022-06-13 | End: 2023-06-14

## 2022-06-13 NOTE — PROGRESS NOTES
Assessment & Plan     Type 2 diabetes mellitus without complication, without long-term current use of insulin (H)  Increase metformin to 1000 mg twice daily, will plan to recheck surveillance labs in another 3 months.  Reiterated the importance of dietary modification and therapeutic lifestyle changes in the management of this issue.  - metFORMIN (GLUCOPHAGE) 1000 MG tablet; Take 1 tablet (1,000 mg) by mouth 2 times daily (with meals)  - [facility] Adult Diabetes Educator Referral; Future  - Lipid panel reflex to direct LDL Fasting; Future  - Comprehensive metabolic panel; Future  - Hemoglobin A1c; Future    Essential hypertension  Continue lisinopril and hydrochlorothiazide, will reassess in 3 months.  - hydrochlorothiazide (HYDRODIURIL) 25 MG tablet; TAKE 1 TABLET (25 MG) BY MOUTH DAILY  - lisinopril (ZESTRIL) 20 MG tablet; Take 1 tablet (20 mg) by mouth daily             See Patient Instructions    Return in about 3 months (around 9/13/2022) for Diabetes Check, with pre-visit fasting lab.    Horace Pina MD  [facility]    Chen Overton is a 41 year old who presents for the following health issues     History of Present Illness       Diabetes:   He presents for follow up of diabetes.  He is checking home blood glucose a few times a month. He checks blood glucose before meals.  Blood glucose is sometimes over 200 and never under 70. When his blood glucose is low, the patient is asymptomatic for confusion, blurred vision, lethargy and reports not feeling dizzy, shaky, or weak.  He is concerned about blood sugar frequently over 200.  He is not experiencing numbness or burning in feet, excessive thirst, blurry vision, weight changes or redness, sores or blisters on feet.         Hypertension: He presents for follow up of hypertension.  He does not check blood pressure  regularly outside of the clinic. Outside blood pressures have been over 140/90. He does not follow a low salt  diet.       -----    As above.  Has been prescribed low-dose metformin, but he estimates only taking that at most 30% of the time.  He is more compliant with his lisinopril and hydrochlorothiazide.  Weight continues to be an issue, does not exercise with any regularity.        Review of Systems   Constitutional, HEENT, cardiovascular, pulmonary, GI, , musculoskeletal, neuro, skin, endocrine and psych systems are negative, except as otherwise noted.      Objective    BP (!) 150/90 (BP Location: Left arm, Patient Position: Chair, Cuff Size: Adult Large)   Pulse 85   Temp 97  F (36.1  C) (Temporal)   Resp 16   Wt 145.5 kg (320 lb 11.2 oz)   SpO2 97%   BMI 44.73 kg/m    Body mass index is 44.73 kg/m .  Physical Exam   GENERAL: healthy, alert and no distress  NECK: no adenopathy, no asymmetry, masses, or scars and thyroid normal to palpation  RESP: lungs clear to auscultation - no rales, rhonchi or wheezes  CV: regular rate and rhythm, normal S1 S2, no S3 or S4, no murmur, click or rub, no peripheral edema and peripheral pulses strong  ABDOMEN: soft, nontender, no hepatosplenomegaly, no masses and bowel sounds normal  MS: no gross musculoskeletal defects noted, no edema

## 2022-06-13 NOTE — PATIENT INSTRUCTIONS
- Increase the metformin to 1000 mg twice daily.  (Prescription has been sent to your pharmacy)  - Continue all other medications as you have been.    - Please follow-up with me in clinic in 3 months.  - Please come in for fasting labs, a few days prior to the appointment with me.  You may schedule appointments at our , through [employer], or by calling [phone].

## 2022-06-30 ENCOUNTER — VIRTUAL VISIT (OUTPATIENT)
Dept: EDUCATION SERVICES | Facility: CLINIC | Age: 42
End: 2022-06-30
Attending: INTERNAL MEDICINE
Payer: COMMERCIAL

## 2022-06-30 DIAGNOSIS — E11.9 TYPE 2 DIABETES MELLITUS WITHOUT COMPLICATION, WITHOUT LONG-TERM CURRENT USE OF INSULIN (H): ICD-10-CM

## 2022-06-30 PROCEDURE — 98968 PH1 ASSMT&MGMT NQHP 21-30: CPT | Mod: 95

## 2022-06-30 NOTE — PROGRESS NOTES
Type of service:  Video Visit    If the video visit is dropped, the video visit invitation should be resent by: Send to e-mail at: [email or web address]    Originating Location (pt. Location): Home  Distant Location (provider location): Home  Mode of Communication:  Video Conference via [employer]    Video Start Time: 10:02am  Video End Time (time video stopped): 10:28am    How would patient like to obtain AVS? Maggie      Diabetes Self-Management Education & Support    Presents for: Individual review    SUBJECTIVE/OBJECTIVE:  Presents for: Individual review  Accompanied by: Self  Diabetes education in the past 24mo: No  Focus of Visit: Healthy Eating  Diabetes type: Type 2  Date of diagnosis: 4/12/17  Diabetes management related comments/concerns: Says needs to get back on bandwagon.  When he was diagnosed, got his A1C under control. Now his is busy and has 2 kids.  Says food is the main concern.  Says needs to get back on exercise without getting up earlier and sacrificing sleep or doing this at 10pm at night.  Says knows what needs to do but just needs time to do it.    Says also found off the bandwagon with Metformin.  If taken without food, causes diarrhea.     Says takes twice a day- hard to remember in the evening.      Says in the winter was doing 4 days a week on the bike and losing weight.     Transportation concerns: No  Difficulty affording diabetes medication?: No  Difficulty affording diabetes testing supplies?: No  Cultural Influences/Ethnic Background:  Not  or       Diabetes Symptoms & Complications:  Fatigue: No  Neuropathy: No  Polydipsia: No  Polyphagia: No  Polyuria: No  Visual change: No  Slow healing wounds: No  Complications assessed today?: Yes  Autonomic neuropathy: No  CVA: No  Heart disease: No  Nephropathy: No  Peripheral neuropathy: No  Peripheral Vascular Disease: No  Retinopathy: No  Sexual dysfunction: No    Patient Problem List and Family Medical History reviewed for  "relevant medical history, current medical status, and diabetes risk factors.    Vitals:  There were no vitals taken for this visit.  Estimated body mass index is 44.73 kg/m  as calculated from the following:    Height as of 8/2/19: 1.803 m (5' 11\").    Weight as of 6/13/22: 145.5 kg (320 lb 11.2 oz).   Last 3 BP:   BP Readings from Last 3 Encounters:   06/13/22 (!) 150/90   03/13/22 (!) 181/98   01/07/22 (!) 162/90       History   Smoking Status     Never Smoker   Smokeless Tobacco     Never Used       Labs:  Lab Results   Component Value Date    A1C 10.1 02/09/2022    A1C 6.9 07/31/2019     Lab Results   Component Value Date     02/09/2022     07/31/2019     Lab Results   Component Value Date     02/09/2022     07/31/2019     HDL Cholesterol   Date Value Ref Range Status   07/31/2019 34 (L) >39 mg/dL Final     Direct Measure HDL   Date Value Ref Range Status   02/09/2022 37 (L) >=40 mg/dL Final   ]  GFR Estimate   Date Value Ref Range Status   02/09/2022 >90 >60 mL/min/1.73m2 Final     Comment:     Effective December 21, 2021 eGFRcr in adults is calculated using the 2021 CKD-EPI creatinine equation which includes age and gender (Karey stafford al., NEJ, DOI: 10.1056/RGTGlo5449549)   07/31/2019 >90 >60 mL/min/[1.73_m2] Final     Comment:     Non  GFR Calc  Starting 12/18/2018, serum creatinine based estimated GFR (eGFR) will be   calculated using the Chronic Kidney Disease Epidemiology Collaboration   (CKD-EPI) equation.       GFR Estimate If Black   Date Value Ref Range Status   07/31/2019 >90 >60 mL/min/[1.73_m2] Final     Comment:      GFR Calc  Starting 12/18/2018, serum creatinine based estimated GFR (eGFR) will be   calculated using the Chronic Kidney Disease Epidemiology Collaboration   (CKD-EPI) equation.       Lab Results   Component Value Date    CR 0.68 02/09/2022    CR 0.72 07/31/2019     No results found for: MICROALBUMIN    Healthy Eating:  Healthy " "Eating Assessed Today: Yes  Cultural/Mosque diet restrictions?: No  Meals include: Breakfast, Lunch, Dinner, Afternoon Snack  Breakfast: hard boiled eggs and 1 slice toast and coffee  Lunch: Turkey sandwich with couscous salad and diet coke  Dinner: chicken strips and fresh veggies  Snacks: \"struggles between meals\" - Oreos OR small sweet  Other: HS: none  Beverages: Coffee, Water, Diet soda    Being Active:  Being Active Assessed Today: Yes  Exercise:: Yes  Days per week of moderate to strenuous exercise (like a brisk walk): 4  On average, minutes per day of exercise at this level: 60 (Stationary bike in basement - rolled ankle so .)  How intense was your typical exercise? : Moderate (like brisk walking)  Exercise Minutes per Week: 240  Barrier to exercise: Time    Monitoring:  Monitoring Assessed Today: Yes  Did patient bring glucose meter to appointment? : No  Blood Glucose Meter: Other ([employer])  Times checking blood sugar at home (number): Never      Taking Medications:  Diabetes Medication(s)     Biguanides       metFORMIN (GLUCOPHAGE) 1000 MG tablet    Take 1 tablet (1,000 mg) by mouth 2 times daily (with meals)          Taking Medication Assessed Today: Yes  Current Treatments: Oral Medication (taken by mouth)  Problems taking diabetes medications regularly?: Yes (Improving - was forgetting to take at night but working on this)  Diabetes medication side effects?: No (Not if takes with food)    Problem Solving:  Problem Solving Assessed Today: No              Reducing Risks:  Reducing Risks Assessed Today: No  Diabetes Risks: Family History    Healthy Coping:  Healthy Coping Assessed Today: No  Emotional response to diabetes: Ready to learn  Stage of change: PREPARATION (Decided to change - considering how)  Patient Activation Measure Survey Score:  No flowsheet data found.    Diabetes knowledge and skills assessment:   Patient is knowledgeable in diabetes management concepts related to: " Healthy Eating, Being Active, Monitoring, Taking Medication, Problem Solving, Reducing Risks and Healthy Coping    Patient needs further education on the following diabetes management concepts: Taking Medication and Problem Solving    Based on learning assessment above, most appropriate setting for further diabetes education would be: Group class or Individual setting.      INTERVENTIONS:    Education provided today on:  GEORGETTEE Self-Care Behaviors:  Diabetes Pathophysiology  Healthy Eating: carbohydrate counting, consistency in amount, composition, and timing of food intake, weight reduction, heart healthy diet, portion control and plate planning method  Being Active: relationship to blood glucose and describe appropriate activity program  Monitoring: purpose, log and interpret results, individual blood glucose targets and frequency of monitoring  Taking Medication: action of prescribed medication, side effects of prescribed medications and when to take medications  Problem Solving: high blood glucose - causes, signs/symptoms, treatment and prevention and low blood glucose - causes, signs/symptoms, treatment and prevention  Healthy Coping: benefits of making appropriate lifestyle changes    Opportunities for ongoing education and support in diabetes-self management were discussed.    Pt verbalized understanding of concepts discussed and recommendations provided today.       Education Materials Provided:  No new materials provided today      ASSESSMENT:  Tian feels he has the knowledge of what to do but it comes down to implementing it. Feels comfortable with how many carbs and how much to eat, that he has to be more active and has to check blood glucose more and is working on this. He was making some realizations during discussion such as being helpful to have a Metformin pill bottle downstairs to keep in kitchen when getting dinner ready so remembers to take it.  Did inform Tian that Metformin comes in extended release  so if above not help with him taking it more consistently, can always try the slow release and take all 4 tablets in the morning or could look at adding a different medication as well.     To try to help with balancing meals, suggested plate method and provided some meal-planning resources.  Says they try to meal plan most weeks and keeps some quick meals on hand. He feels he struggles most with snacking and realizes he could have other healthy options on hand that are easy to grab and make cookies harder to grab. He has been working on being more active since winter with biking and now yard work and says he has lost 7 lbs and informed Tian that weight loss also can help improve his blood glucose and to keep up the good work.  Offered follow up in 1-2 months to check on blood glucose and changes but patient declines and rather wait for visit with PCP in September.  Encouraged him to reach out by [employer] if has any questions or concerns. Pt verbalized understanding of concepts discussed and recommendations provided.           Patient's most recent   Lab Results   Component Value Date    A1C 10.1 02/09/2022    A1C 6.9 07/31/2019    is not meeting goal of <7.0    PLAN  See Patient Instructions for co-developed, patient-stated behavior change goals.  AVS sent by [employer]. See Follow-Up section for recommended follow-up (Provider in September).    Alicia Parmar RDN, LD, [facility]   Time Spent: 26 minutes  Encounter Type: Individual    Any diabetes medication dose changes were made via the CDE Protocol and Collaborative Practice Agreement with the patient's referring provider. A copy of this encounter was shared with the provider.

## 2022-06-30 NOTE — PATIENT INSTRUCTIONS
"Try grabbing a second pill bottle to keep in the kitchen to remind to take Metformin before dinner.   - If this does not work and you keep missing a dose, there is an extended release version of 500 mg and you could take all 4 tablets at once if needed. Talk with your doctor if you think this would work better.    2. Plate Method at meals:  1/2 plate veggies OR 1/4 veggies and 1/4 fruit (raw, canned, frozen all fine)  1/4 plate lean meat (3-4 oz)  1/4 plate grains, ideally whole grains when able (1/2-1 cup rice/pasta/other grains/potatoes OR 1-2 slices bread OR 2-4 smaller tortillas)    Helpful Website: [employer]  OR for diabetes friendly recipes, check out \"Diabetes Food Hub\" on the American Diabetes Association's website:  https://www.diabetesfoodhub.org/      3. Doing a great job with meal planning and making a list and having some quick kid-friendly \"go to\" meals on hand when things are busy.    4. Since you feel snacking is the biggest problem area for you, work on ways to improve the snack choice. That is a great idea to move cookies to a different spot so not as easy to grab and have a healthy snack that is easy to grab instead or next to the cookies.   -Fruit is a great option and pair with a cheese stick or 1/2-1 oz of nuts if need a little than a fruit to stay full.    5. Try to include the kids with making some of the meal decisions- may make them more open to ideas.    FOLLOW UP: Your doctor would like to see you again in 3 months (Mid-September) for both fasting labs and a follow up.    Please reach out with any questions or concerns.    Alicia Parmar RDN, LD, CDCES   [phone]          "

## 2022-06-30 NOTE — Clinical Note
Hi Dr. Pina,  Met with Tian today and discussed some changes to more consistently take Metformin and work on healthier snacks. He declined a follow up with me and plans to see you again in September.   Thanks! Alicia Parmar RDN, LD, [facility]

## 2022-06-30 NOTE — LETTER
6/30/2022         RE: Al Smith  [address] [facility] [address]        Dear Colleague,    Thank you for referring your patient, Al Smith, to the [facility]. Please see a copy of my visit note below.    Type of service:  Video Visit    If the video visit is dropped, the video visit invitation should be resent by: Send to e-mail at: [email or web address]    Originating Location (pt. Location): Home  Distant Location (provider location): Home  Mode of Communication:  Video Conference via [employer]    Video Start Time: 10:02am  Video End Time (time video stopped): 10:28am    How would patient like to obtain AVS? [employer]      Diabetes Self-Management Education & Support    Presents for: Individual review    SUBJECTIVE/OBJECTIVE:  Presents for: Individual review  Accompanied by: Self  Diabetes education in the past 24mo: No  Focus of Visit: Healthy Eating  Diabetes type: Type 2  Date of diagnosis: 4/12/17  Diabetes management related comments/concerns: Says needs to get back on bandwagon.  When he was diagnosed, got his A1C under control. Now his is busy and has 2 kids.  Says food is the main concern.  Says needs to get back on exercise without getting up earlier and sacrificing sleep or doing this at 10pm at night.  Says knows what needs to do but just needs time to do it.    Says also found off the bandwagon with Metformin.  If taken without food, causes diarrhea.     Says takes twice a day- hard to remember in the evening.      Says in the winter was doing 4 days a week on the bike and losing weight.     Transportation concerns: No  Difficulty affording diabetes medication?: No  Difficulty affording diabetes testing supplies?: No  Cultural Influences/Ethnic Background:  Not  or       Diabetes Symptoms & Complications:  Fatigue: No  Neuropathy: No  Polydipsia: No  Polyphagia: No  Polyuria: No  Visual change: No  Slow healing wounds: No  Complications  "assessed today?: Yes  Autonomic neuropathy: No  CVA: No  Heart disease: No  Nephropathy: No  Peripheral neuropathy: No  Peripheral Vascular Disease: No  Retinopathy: No  Sexual dysfunction: No    Patient Problem List and Family Medical History reviewed for relevant medical history, current medical status, and diabetes risk factors.    Vitals:  There were no vitals taken for this visit.  Estimated body mass index is 44.73 kg/m  as calculated from the following:    Height as of 8/2/19: 1.803 m (5' 11\").    Weight as of 6/13/22: 145.5 kg (320 lb 11.2 oz).   Last 3 BP:   BP Readings from Last 3 Encounters:   06/13/22 (!) 150/90   03/13/22 (!) 181/98   01/07/22 (!) 162/90       History   Smoking Status     Never Smoker   Smokeless Tobacco     Never Used       Labs:  Lab Results   Component Value Date    A1C 10.1 02/09/2022    A1C 6.9 07/31/2019     Lab Results   Component Value Date     02/09/2022     07/31/2019     Lab Results   Component Value Date     02/09/2022     07/31/2019     HDL Cholesterol   Date Value Ref Range Status   07/31/2019 34 (L) >39 mg/dL Final     Direct Measure HDL   Date Value Ref Range Status   02/09/2022 37 (L) >=40 mg/dL Final   ]  GFR Estimate   Date Value Ref Range Status   02/09/2022 >90 >60 mL/min/1.73m2 Final     Comment:     Effective December 21, 2021 eGFRcr in adults is calculated using the 2021 CKD-EPI creatinine equation which includes age and gender (Karey stafford al., NEJM, DOI: 10.1056/DZLDny9904389)   07/31/2019 >90 >60 mL/min/[1.73_m2] Final     Comment:     Non  GFR Calc  Starting 12/18/2018, serum creatinine based estimated GFR (eGFR) will be   calculated using the Chronic Kidney Disease Epidemiology Collaboration   (CKD-EPI) equation.       GFR Estimate If Black   Date Value Ref Range Status   07/31/2019 >90 >60 mL/min/[1.73_m2] Final     Comment:      GFR Calc  Starting 12/18/2018, serum creatinine based estimated GFR " "(eGFR) will be   calculated using the Chronic Kidney Disease Epidemiology Collaboration   (CKD-EPI) equation.       Lab Results   Component Value Date    CR 0.68 02/09/2022    CR 0.72 07/31/2019     No results found for: MICROALBUMIN    Healthy Eating:  Healthy Eating Assessed Today: Yes  Cultural/Synagogue diet restrictions?: No  Meals include: Breakfast, Lunch, Dinner, Afternoon Snack  Breakfast: hard boiled eggs and 1 slice toast and coffee  Lunch: Turkey sandwich with couscous salad and diet coke  Dinner: chicken strips and fresh veggies  Snacks: \"struggles between meals\" - Oreos OR small sweet  Other: HS: none  Beverages: Coffee, Water, Diet soda    Being Active:  Being Active Assessed Today: Yes  Exercise:: Yes  Days per week of moderate to strenuous exercise (like a brisk walk): 4  On average, minutes per day of exercise at this level: 60 (Stationary bike in basement - rolled ankle so .)  How intense was your typical exercise? : Moderate (like brisk walking)  Exercise Minutes per Week: 240  Barrier to exercise: Time    Monitoring:  Monitoring Assessed Today: Yes  Did patient bring glucose meter to appointment? : No  Blood Glucose Meter: Other ([email or web address])  Times checking blood sugar at home (number): Never      Taking Medications:  Diabetes Medication(s)     Biguanides       metFORMIN (GLUCOPHAGE) 1000 MG tablet    Take 1 tablet (1,000 mg) by mouth 2 times daily (with meals)          Taking Medication Assessed Today: Yes  Current Treatments: Oral Medication (taken by mouth)  Problems taking diabetes medications regularly?: Yes (Improving - was forgetting to take at night but working on this)  Diabetes medication side effects?: No (Not if takes with food)    Problem Solving:  Problem Solving Assessed Today: No              Reducing Risks:  Reducing Risks Assessed Today: No  Diabetes Risks: Family History    Healthy Coping:  Healthy Coping Assessed Today: No  Emotional response to diabetes: Ready to " learn  Stage of change: PREPARATION (Decided to change - considering how)  Patient Activation Measure Survey Score:  No flowsheet data found.    Diabetes knowledge and skills assessment:   Patient is knowledgeable in diabetes management concepts related to: Healthy Eating, Being Active, Monitoring, Taking Medication, Problem Solving, Reducing Risks and Healthy Coping    Patient needs further education on the following diabetes management concepts: Taking Medication and Problem Solving    Based on learning assessment above, most appropriate setting for further diabetes education would be: Group class or Individual setting.      INTERVENTIONS:    Education provided today on:  AADE Self-Care Behaviors:  Diabetes Pathophysiology  Healthy Eating: carbohydrate counting, consistency in amount, composition, and timing of food intake, weight reduction, heart healthy diet, portion control and plate planning method  Being Active: relationship to blood glucose and describe appropriate activity program  Monitoring: purpose, log and interpret results, individual blood glucose targets and frequency of monitoring  Taking Medication: action of prescribed medication, side effects of prescribed medications and when to take medications  Problem Solving: high blood glucose - causes, signs/symptoms, treatment and prevention and low blood glucose - causes, signs/symptoms, treatment and prevention  Healthy Coping: benefits of making appropriate lifestyle changes    Opportunities for ongoing education and support in diabetes-self management were discussed.    Pt verbalized understanding of concepts discussed and recommendations provided today.       Education Materials Provided:  No new materials provided today      ASSESSMENT:  Tian feels he has the knowledge of what to do but it comes down to implementing it. Feels comfortable with how many carbs and how much to eat, that he has to be more active and has to check blood glucose more and is  working on this. He was making some realizations during discussion such as being helpful to have a Metformin pill bottle downstairs to keep in kitchen when getting dinner ready so remembers to take it.  Did inform Tian that Metformin comes in extended release so if above not help with him taking it more consistently, can always try the slow release and take all 4 tablets in the morning or could look at adding a different medication as well.     To try to help with balancing meals, suggested plate method and provided some meal-planning resources.  Says they try to meal plan most weeks and keeps some quick meals on hand. He feels he struggles most with snacking and realizes he could have other healthy options on hand that are easy to grab and make cookies harder to grab. He has been working on being more active since winter with biking and now yard work and says he has lost 7 lbs and informed Tian that weight loss also can help improve his blood glucose and to keep up the good work.  Offered follow up in 1-2 months to check on blood glucose and changes but patient declines and rather wait for visit with PCP in September.  Encouraged him to reach out by [employer] if has any questions or concerns. Pt verbalized understanding of concepts discussed and recommendations provided.           Patient's most recent   Lab Results   Component Value Date    A1C 10.1 02/09/2022    A1C 6.9 07/31/2019    is not meeting goal of <7.0    PLAN  See Patient Instructions for co-developed, patient-stated behavior change goals.  AVS sent by [employer]. See Follow-Up section for recommended follow-up (Provider in September).    Alicia Parmar,  ANA CRISTINA, MARY ANNE, BRIGIDOES   Time Spent: 26 minutes  Encounter Type: Individual    Any diabetes medication dose changes were made via the CDE Protocol and Collaborative Practice Agreement with the patient's referring provider. A copy of this encounter was shared with the provider.

## 2022-07-07 DIAGNOSIS — E11.9 TYPE 2 DIABETES MELLITUS WITHOUT COMPLICATION, WITHOUT LONG-TERM CURRENT USE OF INSULIN (H): ICD-10-CM

## 2022-07-11 NOTE — TELEPHONE ENCOUNTER
Pharmacy was called as it appears pt should still have 11 refills left for metformin from 6/13/22 Rx. Pharmacist said that somebody with an unknown name called from the clinic to cancel all refills after the first fill, now the patient is requesting a refill. Will route to PCP for review.

## 2022-10-23 ENCOUNTER — HEALTH MAINTENANCE LETTER (OUTPATIENT)
Age: 42
End: 2022-10-23

## 2023-04-02 ENCOUNTER — HEALTH MAINTENANCE LETTER (OUTPATIENT)
Age: 43
End: 2023-04-02

## 2023-05-19 ENCOUNTER — OFFICE VISIT (OUTPATIENT)
Dept: URGENT CARE | Facility: URGENT CARE | Age: 43
End: 2023-05-19
Payer: COMMERCIAL

## 2023-05-19 VITALS
WEIGHT: 300 LBS | SYSTOLIC BLOOD PRESSURE: 156 MMHG | HEIGHT: 72 IN | OXYGEN SATURATION: 100 % | TEMPERATURE: 96.9 F | DIASTOLIC BLOOD PRESSURE: 91 MMHG | BODY MASS INDEX: 40.63 KG/M2 | RESPIRATION RATE: 20 BRPM | HEART RATE: 73 BPM

## 2023-05-19 DIAGNOSIS — R09.81 CONGESTION OF PARANASAL SINUS: ICD-10-CM

## 2023-05-19 DIAGNOSIS — E11.9 TYPE 2 DIABETES MELLITUS WITHOUT COMPLICATION, WITHOUT LONG-TERM CURRENT USE OF INSULIN (H): ICD-10-CM

## 2023-05-19 DIAGNOSIS — J34.89 SINUS DRAINAGE: Primary | ICD-10-CM

## 2023-05-19 DIAGNOSIS — I10 ESSENTIAL HYPERTENSION: ICD-10-CM

## 2023-05-19 PROCEDURE — 99213 OFFICE O/P EST LOW 20 MIN: CPT | Performed by: FAMILY MEDICINE

## 2023-05-19 RX ORDER — DOXYCYCLINE 100 MG/1
100 CAPSULE ORAL 2 TIMES DAILY
Qty: 14 CAPSULE | Refills: 0 | Status: SHIPPED | OUTPATIENT
Start: 2023-05-19 | End: 2023-05-26

## 2023-05-19 NOTE — PROGRESS NOTES
Chief Complaint   Patient presents with     Sinus Problem     Sore throat, has been hot and cold, congestion.          Al was seen today for sinus problem.    Diagnoses and all orders for this visit:    Sinus drainage    Congestion of paranasal sinus  -     doxycycline hyclate (VIBRAMYCIN) 100 MG capsule; Take 1 capsule (100 mg) by mouth 2 times daily for 7 days    Type 2 diabetes mellitus without complication, without long-term current use of insulin (H)    Essential hypertension      Al Smith is a 42 year old male who presents for evaluation of facial pain.  Signs and symptoms are consistent with sinusitis.  Discussed viral vs bacterial sinusitis with the patient.  Outpatient medications ordered as noted above. Discussed to try mucinex and also flonase and saline nasal spray   If symptoms still dont improve in 2 days then can start the antibiotic     differential diagnosis sinusitis/allergic rhinitis/viral infection doubt serious bacterial infection otherwise.  Supportive outpatient management indicated.  I did discuss with patient that his blood pressure was noted to be elevated patient did skip his blood pressure meds for couple of days I did review with patient the importance of doing medications on a regular basis.  And also doing tighter control of blood pressure . AS  the chest was clear no Rales heard no x-ray or further work-up needed    SUBJECTIVE: Al Smith with h/o HTN, DM is a 42 year old male patient complaining of sinus drainage and thick drainage and also coughing and sorethroat  for 7 day(s).     OBJECTIVE: The patient appears healthy, alert and no distress.   EARS: External ears normal. Canals clear. TM's normal.  NOSE/SINUS: positive findings: mucosa erythematous and swollen, purulent rhinorrhea  Sinus palpation: Frontal sinus and Maxillary sinus nontender to palpation   THROAT: normal   NECK:Neck supple. No adenopathy. Thyroid symmetric, normal size,   CHEST: Clear to  auscultation    Nayely Monaco MD

## 2023-05-19 NOTE — PATIENT INSTRUCTIONS
Continue on mucinex and also saline nasal spray and also flonase   If symptoms dont improve in 3 days can start the antibiotic    Nayely Monaco MD

## 2023-06-01 ENCOUNTER — HEALTH MAINTENANCE LETTER (OUTPATIENT)
Age: 43
End: 2023-06-01

## 2023-06-14 DIAGNOSIS — I10 ESSENTIAL HYPERTENSION: ICD-10-CM

## 2023-06-14 RX ORDER — LISINOPRIL 20 MG/1
TABLET ORAL
Qty: 90 TABLET | Refills: 3 | Status: SHIPPED | OUTPATIENT
Start: 2023-06-14 | End: 2024-04-09

## 2023-06-14 RX ORDER — HYDROCHLOROTHIAZIDE 25 MG/1
TABLET ORAL
Qty: 90 TABLET | Refills: 3 | Status: SHIPPED | OUTPATIENT
Start: 2023-06-14 | End: 2024-04-09

## 2023-09-13 DIAGNOSIS — E11.9 TYPE 2 DIABETES MELLITUS WITHOUT COMPLICATION, WITHOUT LONG-TERM CURRENT USE OF INSULIN (H): ICD-10-CM

## 2023-12-16 NOTE — PROGRESS NOTES
[address] [address]  [phone]  Dept: [phone]    PRE-OP EVALUATION:  Today's date: 2019    Al Smith (: 1980) presents for pre-operative evaluation assessment as requested by Dr. Mckenzie.  He requires evaluation and anesthesia risk assessment prior to undergoing surgery/procedure for treatment of: left knee meniscus tear.    Fax number for surgical facility: [facility]  Primary Physician: Horace Pina  Type of Anesthesia Anticipated: General    Patient has a Health Care Directive or Living Will:  NO    Preop Questions 2019   Who is doing your surgery? Dr. Mckenzie   What are you having done? Arthroscopic Knee Surgery   Date of Surgery/Procedure: 10/4/2019   Facility or Hospital where procedure/surgery will be performed: [facility]   1.  Do you have a history of Heart attack, stroke, stent, coronary bypass surgery, or other heart surgery? No   2.  Do you ever have any pain or discomfort in your chest? No   3.  Do you have a history of  Heart Failure? No   4.   Are you troubled by shortness of breath when:  walking on a level surface, or up a slight hill, or at night? No   5.  Do you currently have a cold, bronchitis or other respiratory infection? No   6.  Do you have a cough, shortness of breath, or wheezing? No   7.  Do you sometimes get pains in the calves of your legs when you walk? No   8. Do you or anyone in your family have previous history of blood clots? No   9.  Do you or does anyone in your family have a serious bleeding problem such as prolonged bleeding following surgeries or cuts? No   10. Have you ever had problems with anemia or been told to take iron pills? No   11. Have you had any abnormal blood loss such as black, tarry or bloody stools? No   12. Have you ever had a blood transfusion? No   13. Have you or any of your relatives ever had problems with anesthesia? No   14. Do you have sleep apnea, excessive  snoring or daytime drowsiness? No   15. Do you have any prosthetic heart valves? No   16. Do you have prosthetic joints? No         HPI:     DIABETES - Patient has a recent diagnosis of DiabetesType Type II . Patient is being treated with diet and exercise. Control has been fair. Complicating factors include but are not limited to: hypertension and morbid obesity .     HYPERTENSION - Patient has longstanding history of HTN , currently denies any symptoms referable to elevated blood pressure. Specifically denies chest pain, palpitations, dyspnea, orthopnea, PND or peripheral edema. Blood pressure readings have been in normal range. Current medication regimen is as listed below. Patient denies any side effects of medication.       MEDICAL HISTORY:     Patient Active Problem List    Diagnosis Date Noted     Type 2 diabetes mellitus without complication, without long-term current use of insulin (H) 04/14/2017     Priority: Medium     Other male erectile dysfunction 04/12/2017     Priority: Medium     Mixed hyperlipidemia 02/15/2016     Priority: Medium     Essential hypertension 02/15/2016     Priority: Medium     Morbid obesity (H) 08/31/2015     Priority: Medium     Seasonal allergic rhinitis 08/31/2015     Priority: Medium      Past Medical History:   Diagnosis Date     Hypertension      Past Surgical History:   Procedure Laterality Date     ORTHOPEDIC SURGERY       Current Outpatient Medications   Medication Sig Dispense Refill     albuterol (PROAIR HFA/PROVENTIL HFA/VENTOLIN HFA) 108 (90 BASE) MCG/ACT Inhaler Inhale 2 puffs into the lungs every 6 hours as needed for shortness of breath / dyspnea or wheezing 1 Inhaler 0     blood glucose monitoring (ONE TOUCH DELICA) lancets Use to test blood sugar 1 times daily or as directed.  Ok to substitute alternative if insurance prefers. 100 each 1     blood glucose monitoring (ONE TOUCH VERIO IQ) test strip Use to test blood sugars 1 times daily or as directed. 100 each 1      fluticasone (FLONASE) 50 MCG/ACT spray Spray 1-2 sprays into both nostrils daily 1 Bottle 11     hydrochlorothiazide (HYDRODIURIL) 25 MG tablet TAKE 1 TABLET (25 MG) BY MOUTH DAILY 90 tablet 3     Ibuprofen (ADVIL PO) Take by mouth as needed for moderate pain       lisinopril (PRINIVIL/ZESTRIL) 20 MG tablet Take 1 tablet (20 mg) by mouth daily 90 tablet 3     triamcinolone (KENALOG) 0.1 % external cream Apply topically 2 times daily 30 g 3     OTC products: NSAIDS, rarely    Allergies   Allergen Reactions     Cat Hair [Cats]      Dog Hair [Dogs]      Dust Mite Extract      Pollen [Pollen Extract]       Latex Allergy: NO    Social History     Tobacco Use     Smoking status: Never Smoker     Smokeless tobacco: Never Used   Substance Use Topics     Alcohol use: Yes     Comment: socially     History   Drug Use No       REVIEW OF SYSTEMS:   Constitutional, neuro, ENT, endocrine, pulmonary, cardiac, gastrointestinal, genitourinary, musculoskeletal, integument and psychiatric systems are negative, except as otherwise noted.    EXAM:   /80 (BP Location: Left arm, Patient Position: Chair, Cuff Size: Adult Large)   Pulse 85   Temp 98.2  F (36.8  C) (Oral)   Resp 16   Wt 143.7 kg (316 lb 12.8 oz)   SpO2 98%   BMI 44.18 kg/m    GENERAL APPEARANCE: healthy, alert and no distress  HENT: ear canals and TM's normal and nose and mouth without ulcers or lesions  RESP: lungs clear to auscultation - no rales, rhonchi or wheezes  CV: regular rate and rhythm, normal S1 S2, no S3 or S4 and no murmur, click or rub   ABDOMEN: soft, nontender, no HSM or masses and bowel sounds normal  NEURO: Normal strength and tone, sensory exam grossly normal, mentation intact and speech normal    DIAGNOSTICS:   EKG: appears normal, NSR, normal axis, normal intervals, no acute ST/T changes c/w ischemia, no LVH by voltage criteria    Recent Labs   Lab Test 07/31/19  0853 07/31/18  0055 01/17/18  0918   HGB  --  15.4 16.7   PLT  --  152 144*     136 135   POTASSIUM 4.5 3.8 3.9   CR 0.72 0.79 0.76   A1C 6.9*  --  5.8        IMPRESSION:   Reason for surgery/procedure: Left knee meniscus tear  Diagnosis/reason for consult: HTN, DM, obesity    The proposed surgical procedure is considered LOW risk.    REVISED CARDIAC RISK INDEX  The patient has the following serious cardiovascular risks for perioperative complications such as (MI, PE, VFib and 3  AV Block):  No serious cardiac risks  INTERPRETATION: 0 risks: Class I (very low risk - 0.4% complication rate)    The patient has the following additional risks for perioperative complications:  Morbid obesity      ICD-10-CM    1. Preop general physical exam Z01.818 EKG 12-lead complete w/read - Clinics   2. Psoriasis L40.9 triamcinolone (KENALOG) 0.1 % external cream   3. Morbid obesity (H) E66.01    4. Essential hypertension I10    5. Type 2 diabetes mellitus without complication, without long-term current use of insulin (H) E11.9        RECOMMENDATIONS:     --Patient is to take all scheduled medications on the day of surgery EXCEPT for modifications listed below.    Anticoagulant or Antiplatelet Medication Use  NSAIDS: Ibuprofen (Motrin):         Stop one day prior to surgery        APPROVAL GIVEN to proceed with proposed procedure, without further diagnostic evaluation       Signed Electronically by: Horace Pina MD    Copy of this evaluation report is provided to requesting physician.    Tj Preop Guidelines    Revised Cardiac Risk Index   Fall Precaution

## 2024-01-14 ENCOUNTER — HEALTH MAINTENANCE LETTER (OUTPATIENT)
Age: 44
End: 2024-01-14

## 2024-04-07 DIAGNOSIS — I10 ESSENTIAL HYPERTENSION: ICD-10-CM

## 2024-04-09 RX ORDER — LISINOPRIL 20 MG/1
TABLET ORAL
Qty: 90 TABLET | Refills: 3 | Status: SHIPPED | OUTPATIENT
Start: 2024-04-09

## 2024-04-09 RX ORDER — HYDROCHLOROTHIAZIDE 25 MG/1
TABLET ORAL
Qty: 90 TABLET | Refills: 3 | Status: SHIPPED | OUTPATIENT
Start: 2024-04-09

## 2024-06-02 ENCOUNTER — HEALTH MAINTENANCE LETTER (OUTPATIENT)
Age: 44
End: 2024-06-02

## 2024-08-11 ENCOUNTER — HEALTH MAINTENANCE LETTER (OUTPATIENT)
Age: 44
End: 2024-08-11

## 2024-12-23 ENCOUNTER — OFFICE VISIT (OUTPATIENT)
Dept: URGENT CARE | Facility: URGENT CARE | Age: 44
End: 2024-12-23
Payer: COMMERCIAL

## 2024-12-23 VITALS
RESPIRATION RATE: 20 BRPM | TEMPERATURE: 98.6 F | SYSTOLIC BLOOD PRESSURE: 141 MMHG | OXYGEN SATURATION: 100 % | DIASTOLIC BLOOD PRESSURE: 106 MMHG | HEART RATE: 88 BPM

## 2024-12-23 DIAGNOSIS — R05.9 COUGH, UNSPECIFIED TYPE: ICD-10-CM

## 2024-12-23 DIAGNOSIS — R09.81 NASAL CONGESTION: ICD-10-CM

## 2024-12-23 DIAGNOSIS — Z20.828 EXPOSURE TO INFLUENZA: Primary | ICD-10-CM

## 2024-12-23 LAB
FLUAV AG SPEC QL IA: NEGATIVE
FLUBV AG SPEC QL IA: NEGATIVE

## 2024-12-23 PROCEDURE — 87804 INFLUENZA ASSAY W/OPTIC: CPT | Performed by: PHYSICIAN ASSISTANT

## 2024-12-23 PROCEDURE — 99214 OFFICE O/P EST MOD 30 MIN: CPT | Performed by: PHYSICIAN ASSISTANT

## 2024-12-23 RX ORDER — OSELTAMIVIR PHOSPHATE 75 MG/1
75 CAPSULE ORAL DAILY
Qty: 10 CAPSULE | Refills: 0 | Status: SHIPPED | OUTPATIENT
Start: 2024-12-23 | End: 2025-01-02

## 2024-12-23 RX ORDER — BENZONATATE 200 MG/1
200 CAPSULE ORAL 3 TIMES DAILY PRN
Qty: 21 CAPSULE | Refills: 0 | Status: SHIPPED | OUTPATIENT
Start: 2024-12-23 | End: 2024-12-30

## 2025-02-23 ENCOUNTER — HEALTH MAINTENANCE LETTER (OUTPATIENT)
Age: 45
End: 2025-02-23

## 2025-05-11 ENCOUNTER — OFFICE VISIT (OUTPATIENT)
Dept: URGENT CARE | Facility: URGENT CARE | Age: 45
End: 2025-05-11
Payer: COMMERCIAL

## 2025-05-11 VITALS
TEMPERATURE: 98.6 F | SYSTOLIC BLOOD PRESSURE: 158 MMHG | BODY MASS INDEX: 40.69 KG/M2 | WEIGHT: 300 LBS | HEART RATE: 76 BPM | DIASTOLIC BLOOD PRESSURE: 94 MMHG | RESPIRATION RATE: 16 BRPM | OXYGEN SATURATION: 97 %

## 2025-05-11 DIAGNOSIS — H00.014 HORDEOLUM EXTERNUM OF LEFT UPPER EYELID: Primary | ICD-10-CM

## 2025-05-11 PROCEDURE — 99213 OFFICE O/P EST LOW 20 MIN: CPT | Performed by: FAMILY MEDICINE

## 2025-05-11 PROCEDURE — 3080F DIAST BP >= 90 MM HG: CPT | Performed by: FAMILY MEDICINE

## 2025-05-11 PROCEDURE — 3077F SYST BP >= 140 MM HG: CPT | Performed by: FAMILY MEDICINE

## 2025-05-11 RX ORDER — POLYMYXIN B SULFATE AND TRIMETHOPRIM 1; 10000 MG/ML; [USP'U]/ML
SOLUTION OPHTHALMIC
Qty: 10 ML | Refills: 0 | Status: SHIPPED | OUTPATIENT
Start: 2025-05-11 | End: 2025-05-18

## 2025-05-11 RX ORDER — SEMAGLUTIDE 2.68 MG/ML
INJECTION, SOLUTION SUBCUTANEOUS
COMMUNITY

## 2025-05-11 RX ORDER — SEMAGLUTIDE 0.68 MG/ML
INJECTION, SOLUTION SUBCUTANEOUS
COMMUNITY
Start: 2024-06-19

## 2025-05-11 RX ORDER — SEMAGLUTIDE 1.34 MG/ML
INJECTION, SOLUTION SUBCUTANEOUS
COMMUNITY
Start: 2024-08-08

## 2025-05-11 RX ORDER — PIOGLITAZONE 15 MG/1
1 TABLET ORAL
COMMUNITY
Start: 2025-01-21

## 2025-05-11 ASSESSMENT — ENCOUNTER SYMPTOMS: EYE PAIN: 1

## 2025-05-11 NOTE — PROGRESS NOTES
Urgent Care Clinic Visit    Chief Complaint   Patient presents with    Eye Problem     L eye is swollen, red, tender X 1 days-was weeping last night              5/11/2025     2:13 PM   Additional Questions   Roomed by Ky   Accompanied by Self

## 2025-05-11 NOTE — PATIENT INSTRUCTIONS
Thank you for choosing us for your care. I have placed an order for a prescription so that you can start treatment. View your full visit summary for details by clicking on the link below. Your pharmacist will able to address any questions you may have about the medication.     If you re not feeling better within 2-3 days, please schedule an appointment.  You can schedule an appointment right here in [facility], or call [phone]  If the visit is for the same symptoms as your eVisit, we ll refund the cost of your eVisit if seen within seven days.

## 2025-05-11 NOTE — PROGRESS NOTES
Assessment & Plan     Hordeolum externum of left upper eyelid  Acute problem, no evidence of preseptal cellulitis.  Mild surrounding conjunctivitis.  Recommend using Polytrim.  Warm compress 4-5 times daily.  - polymixin b-trimethoprim (POLYTRIM) 76138-1.1 UNIT/ML-% ophthalmic solution  Dispense: 10 mL; Refill: 0      Return in about 1 week (around 5/18/2025) for If symptoms do not improve or gets worse..    Miki Wild MD  [facility] Rashide is a 44 year old male who presents to clinic today for the following health issues:  Chief Complaint   Patient presents with    Eye Problem     L eye is swollen, red, tender X 1 days-was weeping last night     Conjunctivitis         5/11/2025     2:13 PM   Additional Questions   Roomed by Ky   Accompanied by Self     Eye Problem     Conjunctivitis        Patient is a 44-year-old male who presents with left upper eyelid swelling tender, eye has been more red and weeping.  History of keratoconus. No blurry vision or fever.       Review of Systems   Eyes:  Positive for pain.           Objective    BP (!) 158/94   Pulse 76   Temp 98.6  F (37  C) (Tympanic)   Resp 16   Wt 136.1 kg (300 lb)   SpO2 97%   BMI 40.69 kg/m    Physical Exam  Eyes:      Extraocular Movements: Extraocular movements intact.      Pupils: Pupils are equal, round, and reactive to light.        Comments: Left conjunctival injection, left upper eyelid with erythematous papule.

## 2025-06-15 ENCOUNTER — HEALTH MAINTENANCE LETTER (OUTPATIENT)
Age: 45
End: 2025-06-15